# Patient Record
Sex: FEMALE | Race: WHITE | NOT HISPANIC OR LATINO | Employment: FULL TIME | URBAN - METROPOLITAN AREA
[De-identification: names, ages, dates, MRNs, and addresses within clinical notes are randomized per-mention and may not be internally consistent; named-entity substitution may affect disease eponyms.]

---

## 2022-10-17 ENCOUNTER — TRANSFERRED RECORDS (OUTPATIENT)
Dept: HEALTH INFORMATION MANAGEMENT | Facility: CLINIC | Age: 32
End: 2022-10-17

## 2022-11-04 ENCOUNTER — MEDICAL CORRESPONDENCE (OUTPATIENT)
Dept: HEALTH INFORMATION MANAGEMENT | Facility: CLINIC | Age: 32
End: 2022-11-04

## 2022-11-07 ENCOUNTER — TELEPHONE (OUTPATIENT)
Dept: OTOLARYNGOLOGY | Facility: CLINIC | Age: 32
End: 2022-11-07

## 2022-11-07 NOTE — TELEPHONE ENCOUNTER
Writer reached out to patient and received their voicemail. Writer left message requesting they get records sent to us as patient has no records within our healthcare system as well as no information about the referring provider, no any Care Everywhere documents. Writer left fax number for patient to have records sent to as well as direct number for call back. Patient had made note that they wanted to be seen by Dr. Hdz, however, Dr. Hdz will not take new stenosis patients and instead this patient would likely be seen by Dr. Zahida Ruiz or Dr. Kelsey Valadez.

## 2022-11-07 NOTE — TELEPHONE ENCOUNTER
[facility] Call Center    Phone Message    May a detailed message be left on voicemail: yes     Reason for Call: Appointment Intake    Referring Provider Name: Dr Karen Burns, Pulmonologist at [facility]   Diagnosis and/or Symptoms: SUBGLOTTIC STENOSIS    Pt had a bronchoscopy done on Thursday last week and was diagnosed with Subglottic Stenosis. Pt's airway is 85% closed. The pulmonologist wants pt to get in urgently for a surgery. Pt states that the provider that they refererd Pt to within the [facility] system, Pt hasn't been able to get a hold of and would like to move forward with care from [facility].     Referral will be coming from Dr Burns and 's Rheumotologist, Dr Marcella Briseno.     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     Please follow-up with pt to advise.    Action Taken: Other: ent    Travel Screening: Not Applicable

## 2022-11-08 ENCOUNTER — TRANSCRIBE ORDERS (OUTPATIENT)
Dept: OTHER | Age: 32
End: 2022-11-08

## 2022-11-08 DIAGNOSIS — M31.30 GRANULOMATOSIS WITH POLYANGIITIS WITHOUT RENAL INVOLVEMENT (H): Primary | ICD-10-CM

## 2022-11-08 DIAGNOSIS — J38.6 SUBGLOTTIC STENOSIS: ICD-10-CM

## 2022-11-08 NOTE — TELEPHONE ENCOUNTER
FUTURE VISIT INFORMATION      FUTURE VISIT INFORMATION:    Date: 11/21/22    Time: 7:30am    Location: [facility]   REFERRAL INFORMATION:    Referring provider:  Marcella Bojorquez MD    Referring providers clinic:  [facility]    Reason for visit/diagnosis  Per Pt, dx subglottic stenosis referral from Marcella Bojorquez recs being requested by patient    RECORDS REQUESTED FROM:       Clinic name Comments Records Status Imaging Status   [facility] 11/4/22- note from  Marcella Bojroquez MD  11/3/22- Bronchoscopy Flexible   10/17/22- PFT   10/14/22- CT chest   2/16/22- NM PET/ CT Skull   2/14/22- PFT Care everywhere  11/8/22- pending req    -Pacs    [facility]  4/3/22- CT Chest Pulmonary   3/4/22- CT chest Pulmonary  Care everywhere  11/8/22- pending req  -PACs                              Records Requested   November 8, 2022 1:43 PM   [facility]    Outcome Faxed an urgent request for image to be push to [facility].     Imaging Received  November 9, 2022 9:37 AM [facility]   Action: Images from [facility] received and resolved to PACS.     11/9/22 10:06am - sending a 2nd urgent request for them to fax the PFT - [facility]    11/9/22 1:29pm - received PFT from [facility] and sent it to scan- [facility]      Records Requested   November 8, 2022 1:50 PM   [facility]    Outcome Faxed request for image to be push to [facility].     * received images from [facility] and attached it to pt in pacs-[facility]

## 2022-11-21 ENCOUNTER — TELEPHONE (OUTPATIENT)
Dept: OTOLARYNGOLOGY | Facility: CLINIC | Age: 32
End: 2022-11-21

## 2022-11-21 ENCOUNTER — VIRTUAL VISIT (OUTPATIENT)
Dept: OTOLARYNGOLOGY | Facility: CLINIC | Age: 32
End: 2022-11-21
Attending: INTERNAL MEDICINE
Payer: COMMERCIAL

## 2022-11-21 ENCOUNTER — PRE VISIT (OUTPATIENT)
Dept: OTOLARYNGOLOGY | Facility: CLINIC | Age: 32
End: 2022-11-21

## 2022-11-21 VITALS
SYSTOLIC BLOOD PRESSURE: 150 MMHG | HEART RATE: 80 BPM | DIASTOLIC BLOOD PRESSURE: 86 MMHG | OXYGEN SATURATION: 97 % | HEIGHT: 71 IN | WEIGHT: 293 LBS | BODY MASS INDEX: 41.02 KG/M2

## 2022-11-21 DIAGNOSIS — M31.30 GRANULOMATOSIS WITH POLYANGIITIS WITHOUT RENAL INVOLVEMENT (H): ICD-10-CM

## 2022-11-21 DIAGNOSIS — R49.0 DYSPHONIA: ICD-10-CM

## 2022-11-21 DIAGNOSIS — J38.6 SUBGLOTTIC STENOSIS: ICD-10-CM

## 2022-11-21 DIAGNOSIS — E66.01 MORBID OBESITY (H): ICD-10-CM

## 2022-11-21 DIAGNOSIS — J38.6 SUBGLOTTIC STENOSIS: Primary | ICD-10-CM

## 2022-11-21 PROCEDURE — 99207 PR NO CHARGE LOS: CPT

## 2022-11-21 PROCEDURE — 31622 DX BRONCHOSCOPE/WASH: CPT | Performed by: OTOLARYNGOLOGY

## 2022-11-21 PROCEDURE — 99205 OFFICE O/P NEW HI 60 MIN: CPT | Mod: 25 | Performed by: OTOLARYNGOLOGY

## 2022-11-21 RX ORDER — SODIUM CHLORIDE FOR INHALATION 0.9 %
5 VIAL, NEBULIZER (ML) INHALATION PRN
Qty: 600 ML | Refills: 4 | Status: SHIPPED | OUTPATIENT
Start: 2022-11-21

## 2022-11-21 RX ORDER — NAPROXEN 500 MG/1
TABLET ORAL
COMMUNITY
Start: 2022-04-29

## 2022-11-21 RX ORDER — BUDESONIDE 0.5 MG/2ML
0.5 INHALANT ORAL PRN
Qty: 240 ML | Refills: 4 | Status: SHIPPED | OUTPATIENT
Start: 2022-11-21

## 2022-11-21 RX ORDER — PSYLLIUM HUSK 0.4 G
1 CAPSULE ORAL DAILY
COMMUNITY

## 2022-11-21 RX ORDER — ASCORBIC ACID 500 MG
500 TABLET ORAL DAILY
COMMUNITY

## 2022-11-21 RX ORDER — LABETALOL 200 MG/1
200 TABLET, FILM COATED ORAL 2 TIMES DAILY
COMMUNITY
Start: 2021-05-24

## 2022-11-21 RX ORDER — PREDNISONE 5 MG/ML
7.5 SOLUTION ORAL
COMMUNITY
Start: 2022-01-28 | End: 2022-11-22

## 2022-11-21 ASSESSMENT — PAIN SCALES - GENERAL: PAINLEVEL: NO PAIN (0)

## 2022-11-21 NOTE — PROGRESS NOTES
Dear Dr. Bojorquez:    I had the pleasure of meeting Deepthi Aj in consultation at the [facility] the [facility] at your request, for evaluation of breathing problems. The note from our visit follows. Speech recognition software may have been used in the documentation below; input is reviewed before signature to the best of my ability. I appreciate the opportunity to participate in the care of this pleasant patient.    Please feel free to contact me with any questions.    Sincerely yours,    Kelsey Valadez M.D., M.P.H.  , Laryngology  Director, [facility]  Otolaryngology- Head & Neck Surgery  [phone]          =====    HISTORY OF PRESENT ILLNESS:   Deepthi Aj is a pleasant 31 year old female with a history of GPA (c-ANCA, NV-3 positive) who presents with a two month history of throat concerns. Symptoms include mucus in throat, frequent throat-clearing and shortness of breath.    Breathing  She was diagnosed with GPA in Jan 2022 related to sinonasal issues.    She has a history of asthma, but developed a new set of symptoms in early October when it became difficult to climb stairs.  The other symptoms are tied to the breathing issue.  Had Bronchoscopy done earlier this month and was noted to have subglottic stenosis.  Currently can walk a short distance on level ground.    Also has persistent nasal congestion related to GPA    Rituxan has helped with lungs but sinonasal issues have persisted. Currently down to 7.5 mg of prednisone, has been there for 2 months. No med changes prior to onset of current breathing symptoms.    No prior intubations. No family history.    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     Cough/Throat-clearing  Cough began at the same time; worse  in the morning, but sometimes wakes her at night.  Triggers include talking, laughing, eating, mucus.  Sometimes also triggered by the sense that something is stuck in her throat.    Throat discomfort  Has throat discomfort in the morning, feels very dry. Gets better over the course of the day, but worsens with exertion.    Reflux-type symptoms  She experiences heartburn/indigestion: never now; only during pregnancy. She is taking reflux medications.      Prior Epic/ outside records were reviewed for this visit, including:  Karen Burns MD  11/8/22, 11/3/22  Marcella Bojorquez MD - 11/04/2022         MEDICATIONS:     Current Outpatient Medications   Medication Sig Dispense Refill     Avacopan 10 MG CAPS        Calcium Carb-Cholecalciferol (CALCIUM 1000 + D) 1000-20 MG-MCG TABS        cholecalciferol 125 MCG (5000 UT) CAPS two times a day.       labetalol (NORMODYNE) 200 MG tablet Take 200 mg by mouth       naproxen (NAPROSYN) 500 MG tablet TAKE ONE TABLET BY MOUTH TWICE DAILY AS NEEDED       omeprazole (PRILOSEC) 20 MG DR capsule Take 20 mg by mouth       predniSONE (DELTASONE) 5 MG/5ML solution 7.5 mg       riTUXimab (RITUXAN) 10 MG/ML injection        vitamin C (ASCORBIC ACID) 500 MG tablet Take 500 mg by mouth         ALLERGIES:    Allergies   Allergen Reactions     Metformin Diarrhea and Nausea and Vomiting       PAST MEDICAL HISTORY: No past medical history on file.   Per CareEverywhere:  Problem Noted Date   Subglottic stenosis 11/04/2022   Granulomatosis with polyangiitis without renal involvement 02/10/2022   Supervision of other high risk pregnancy, antepartum 09/11/2020   Pre-existing hypertension during pregnancy, antepartum 09/11/2020   Asthma, exercise induced 05/12/2017   Insulin resistance 11/27/2015   Elevated blood pressure reading without diagnosis of hypertension 08/07/2015   Breast mass 08/15/2014   BMI 60.0-69.9, adult 07/14/2010   Varicella 04/03/2008       PAST SURGICAL HISTORY: No past  surgical history on file.   Surgery Date Site/Laterality Comments   WISDOM TEETH EXTRACTION           SECTION          HABITS/SOCIAL HISTORY:    Social History     Tobacco Use     Smoking status: Not on file     Smokeless tobacco: Not on file   Substance Use Topics     Alcohol use: Not on file     Smoking Tobacco: Never           Smokeless Tobacco: Never             Social History  Alcohol Use Standard Drinks/Week   Yes 0 (1 standard drink = 0.6 oz pure alcohol)         FAMILY HISTORY:  No family history on file. Noncontributory.    REVIEW OF SYSTEMS:  Comprehensive 11 point review of systems was reviewed. Positives are as noted below; pertinent findings are as noted in the HPI.     Patient Supplied Answers to Review of Systems   ENT ROS 2022   Cardiopulmonary: Breathing problems, Wheezing         PHYSICAL EXAMINATION:  General: The patient was alert and conversant, and in no acute distress.  BMI 64.  Eyes: PERRL, conjunctiva and lids normal, sclera nonicteric.  Nose: Anterior rhinoscopy: no gross abnormalities. no  bleeding; no significant mucopurulence; septum grossly normal, moderate  mucoid drainage and/or crusting. Saddle nose deformity.  Oral cavity/oropharynx: No masses or lesions. Dentition in good condition. Floor of mouth and oral tongue soft to palpation. Tongue mobility and palate elevation intact and symmetric.  Ears: Normal auricles, external auditory canals bilaterally. Visualized portions of tympanic membranes normal bilaterally.   Neck: No palpable cervical lymphadenopathy. There was mild tenderness to palpation of the thyrohyoid space, which was narrow. No obvious thyroid abnormality. Landmarks indistinct due to habitus. Good neck extension.  Resp: Breathing comfortably, no stridor or stertor at rest, but with obvious noisy breathing on rapid inhalation.  Neuro: Symmetric facial function. Other cranial nerves as documented above.  Psych: Normal affect, pleasant and  cooperative.  Voice/speech: Mild dysphonia characterized by breathiness and roughness.  Extremities: No cyanosis, clubbing, or edema of the upper extremities.      PROCEDURE:   Flexible Bronchoscopy (00338)  Indications: This procedure was warranted to evaluate the patient's airway anatomy. Risks, benefits, and alternatives were discussed.  Description: After informed consent was obtained, a time-out was performed to confirm patient identity, procedure, and procedure site. Topical 3% lidocaine with 0.25% phenylephrine was applied to the nasal cavities and oropharynx. 3 ml of 2% lidocaine was delivered via catheter through the channel of the endoscope and the patient was instructed to cough and expectorate into the suction. I performed the endoscopy and no complications were apparent.  Performed by: Kelsey Valadez MD MPH  Findings: Glottis patent. Subglottis with moderate to severe stenosis. Trachea clear. Kim sharp. Unremarkable takeoffs of mainstem bronchi.                              LABS:                Chest CT 10/14/22    IMAGING/RESULTS reviewed, with pertinent report excerpts below:  LUNG AND LARGE AIRWAYS: Previously seen cavitary lesions in both upper lobes and right lower lobe are collapsed with residual nodular opacities largest measuring 8 mm. Focal consolidation in the anterior medial right upper lobe measuring 3.3 x 1.1 cm is significantly decreased since the prior examination previously measuring up to 4.8 x 5 cm. 6 mm nodule in the medial aspect of the right middle lobe series 3 image 40 is significantly decreased from the prior (previous 1.4 cm. Previously seen subpleural nodular opacity in the medial left upper lobe is nearly completely resolved with some minimal residual scarring (image 11). No new or enlarging pulmonary nodules or consolidative opacities. No persistent cavitary lesions.     IMPRESSION:   1. Collapse of previously seen cavitary lesions in both lungs. Residual nodular  opacities measuring up to 8 mm.   2. Interval significant decrease in size of consolidative pulmonary opacities in both lungs.    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 She is on prednisone 7.5 mg daily at present, and the plan is to increase frequency of rituximab moving forward. She denies a substantial intubation history. Personal history of autoimmune problems is positive for GPA as above; family history of autoimmune problems is negative.     We discussed potential means of managing airway stenosis, including open resection, and endoscopic incision and dilation. She was more interested in the endoscopic approach (microdirect laryngoscopy with incision, steroid injection, possible balloon dilation of subglottic stenosis, possible CO2 laser. Risks of surgery, including injury to lips/ teeth/ tongue/ jaw/ throat/ airway/lungs, risks of general anesthesia, and temporary/permanent hoarseness were discussed. This included temporary or permanent dysgeusia, hypesthesia, or motion abnormalities of the tongue. We also discussed a potential need for additional procedures in the future, particularly if the stenosis recurs, which is relatively common. We discussed that tracheotomy is rarely necessary. She was comfortable with steroid injection into the stenosis. If she requires frequent surgical intervention we will consider mitomycin in the future. We discussed that the procedure will be outpatient with a low threshold for overnight observation if there are any concerns, since it is an airway procedure. She understands that she will need a pre-operative history and physical as well as COVID testing. After hearing the risks and benefits, she indicated that she would like to proceed with endoscopic surgery. We will get this scheduled over the next few weeks.    We also discussed potential adjuvant medical management post-op including PPI, steroid inhaler, low dose Bactrim,  and potential advantages/disadvantages of each. She is already on PPI and Bactrim, and is comfortable with starting routine steroid nebs. We will also rx saline as needed for nebulization, to thin secretions and make them easier to cough out.    Finally we also discussed consideration of serial awake steroid injections post-operatively. She would like to hold off on scheduling those for now.    She was provided a peak flow meter and instructions as well, for disease monitoring.    I appreciate the opportunity to participate in the care of this patient.  Today's visit required additional screening time, PPE, and cleaning measures to allow for a safe in-person visit, due to the public health emergency.  I spent a total of 65 minutes on 11/21/2022 in chart review, review of tests, patient visit, documentation, care coordination, and/or discussion with other providers about the issues documented above, separate from any documented procedure(s).

## 2022-11-21 NOTE — TELEPHONE ENCOUNTER
Called patient to schedule surgery with Dr. Valadez    Date of Surgery: 11/23    Location of surgery: [facility]    Pre-Op H&P: PCP    Pre/Post Imaging:  Not Applicable    Discussed COVID-19 Testing: Yes home test    Post-Op Appt Date: 3 weeks    Surgery Packet Mailed: sohan      Additional comments: patient was able to get in with her PCP tomorrow 11/22. Will fax paperwork        Dariana Cook on 11/21/2022 at 10:39 AM

## 2022-11-21 NOTE — LETTER
11/21/2022      RE: Deepthi Aj  [address] [facility] [address]       Dear Dr. Bojorquez:    I had the pleasure of meeting Deepthi Aj in consultation at the [facility] the [facility] at your request, for evaluation of breathing problems. The note from our visit follows. Speech recognition software may have been used in the documentation below; input is reviewed before signature to the best of my ability. I appreciate the opportunity to participate in the care of this pleasant patient.    Please feel free to contact me with any questions.    Sincerely yours,    Kelsey Valadez M.D., M.P.H.  , Laryngology  Director, [facility]  Otolaryngology- Head & Neck Surgery  [phone]          =====    HISTORY OF PRESENT ILLNESS:   Deepthi Aj is a pleasant 31 year old female with a history of GPA (c-ANCA, TN-3 positive) who presents with a two month history of throat concerns. Symptoms include mucus in throat, frequent throat-clearing and shortness of breath.    Breathing  She was diagnosed with GPA in Jan 2022 related to sinonasal issues.    She has a history of asthma, but developed a new set of symptoms in early October when it became difficult to climb stairs.  The other symptoms are tied to the breathing issue.  Had Bronchoscopy done earlier this month and was noted to have subglottic stenosis.  Currently can walk a short distance on level ground.    Also has persistent nasal congestion related to GPA    Rituxan has helped with lungs but sinonasal issues have persisted. Currently down to 7.5 mg of prednisone, has been there for 2 months. No med changes prior to onset of current breathing symptoms.    No prior intubations. No family history.    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     Cough/Throat-clearing  Cough began at the same time; worse in the morning, but sometimes wakes her at night.  Triggers include talking, laughing, eating, mucus.  Sometimes also triggered by the sense that something is stuck in her throat.    Throat discomfort  Has throat discomfort in the morning, feels very dry. Gets better over the course of the day, but worsens with exertion.    Reflux-type symptoms  She experiences heartburn/indigestion: never now; only during pregnancy. She is taking reflux medications.      Prior Epic/ outside records were reviewed for this visit, including:  Karen Burns MD  11/8/22, 11/3/22  Marcella Bojorquez MD - 11/04/2022         MEDICATIONS:     Current Outpatient Medications   Medication Sig Dispense Refill     Avacopan 10 MG CAPS        Calcium Carb-Cholecalciferol (CALCIUM 1000 + D) 1000-20 MG-MCG TABS        cholecalciferol 125 MCG (5000 UT) CAPS two times a day.       labetalol (NORMODYNE) 200 MG tablet Take 200 mg by mouth       naproxen (NAPROSYN) 500 MG tablet TAKE ONE TABLET BY MOUTH TWICE DAILY AS NEEDED       omeprazole (PRILOSEC) 20 MG DR capsule Take 20 mg by mouth       predniSONE (DELTASONE) 5 MG/5ML solution 7.5 mg       riTUXimab (RITUXAN) 10 MG/ML injection        vitamin C (ASCORBIC ACID) 500 MG tablet Take 500 mg by mouth         ALLERGIES:    Allergies   Allergen Reactions     Metformin Diarrhea and Nausea and Vomiting       PAST MEDICAL HISTORY: No past medical history on file.   Per CareEverywhere:  Problem Noted Date   Subglottic stenosis 11/04/2022   Granulomatosis with polyangiitis without renal involvement 02/10/2022   Supervision of other high risk pregnancy, antepartum 09/11/2020   Pre-existing hypertension during pregnancy, antepartum 09/11/2020   Asthma, exercise induced 05/12/2017   Insulin resistance 11/27/2015   Elevated blood pressure reading without diagnosis of hypertension 08/07/2015   Breast mass  08/15/2014   BMI 60.0-69.9, adult 2010   Varicella 2008       PAST SURGICAL HISTORY: No past surgical history on file.   Surgery Date Site/Laterality Comments   WISDOM TEETH EXTRACTION           SECTION          HABITS/SOCIAL HISTORY:    Social History     Tobacco Use     Smoking status: Not on file     Smokeless tobacco: Not on file   Substance Use Topics     Alcohol use: Not on file     Smoking Tobacco: Never           Smokeless Tobacco: Never             Social History  Alcohol Use Standard Drinks/Week   Yes 0 (1 standard drink = 0.6 oz pure alcohol)         FAMILY HISTORY:  No family history on file. Noncontributory.    REVIEW OF SYSTEMS:  Comprehensive 11 point review of systems was reviewed. Positives are as noted below; pertinent findings are as noted in the HPI.     Patient Supplied Answers to Review of Systems   ENT ROS 2022   Cardiopulmonary: Breathing problems, Wheezing         PHYSICAL EXAMINATION:  General: The patient was alert and conversant, and in no acute distress.  BMI 64.  Eyes: PERRL, conjunctiva and lids normal, sclera nonicteric.  Nose: Anterior rhinoscopy: no gross abnormalities. no  bleeding; no significant mucopurulence; septum grossly normal, moderate  mucoid drainage and/or crusting. Saddle nose deformity.  Oral cavity/oropharynx: No masses or lesions. Dentition in good condition. Floor of mouth and oral tongue soft to palpation. Tongue mobility and palate elevation intact and symmetric.  Ears: Normal auricles, external auditory canals bilaterally. Visualized portions of tympanic membranes normal bilaterally.   Neck: No palpable cervical lymphadenopathy. There was mild tenderness to palpation of the thyrohyoid space, which was narrow. No obvious thyroid abnormality. Landmarks indistinct due to habitus. Good neck extension.  Resp: Breathing comfortably, no stridor or stertor at rest, but with obvious noisy breathing on rapid inhalation.  Neuro: Symmetric facial  function. Other cranial nerves as documented above.  Psych: Normal affect, pleasant and cooperative.  Voice/speech: Mild dysphonia characterized by breathiness and roughness.  Extremities: No cyanosis, clubbing, or edema of the upper extremities.      PROCEDURE:   Flexible Bronchoscopy (25974)  Indications: This procedure was warranted to evaluate the patient's airway anatomy. Risks, benefits, and alternatives were discussed.  Description: After informed consent was obtained, a time-out was performed to confirm patient identity, procedure, and procedure site. Topical 3% lidocaine with 0.25% phenylephrine was applied to the nasal cavities and oropharynx. 3 ml of 2% lidocaine was delivered via catheter through the channel of the endoscope and the patient was instructed to cough and expectorate into the suction. I performed the endoscopy and no complications were apparent.  Performed by: Kelsey Valadez MD MPH  Findings: Glottis patent. Subglottis with moderate to severe stenosis. Trachea clear. Kim sharp. Unremarkable takeoffs of mainstem bronchi.                              LABS:                Chest CT 10/14/22    IMAGING/RESULTS reviewed, with pertinent report excerpts below:  LUNG AND LARGE AIRWAYS: Previously seen cavitary lesions in both upper lobes and right lower lobe are collapsed with residual nodular opacities largest measuring 8 mm. Focal consolidation in the anterior medial right upper lobe measuring 3.3 x 1.1 cm is significantly decreased since the prior examination previously measuring up to 4.8 x 5 cm. 6 mm nodule in the medial aspect of the right middle lobe series 3 image 40 is significantly decreased from the prior (previous 1.4 cm. Previously seen subpleural nodular opacity in the medial left upper lobe is nearly completely resolved with some minimal residual scarring (image 11). No new or enlarging pulmonary nodules or consolidative opacities. No persistent cavitary lesions.     IMPRESSION:    1. Collapse of previously seen cavitary lesions in both lungs. Residual nodular opacities measuring up to 8 mm.   2. Interval significant decrease in size of consolidative pulmonary opacities in both lungs.    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 She is on prednisone 7.5 mg daily at present, and the plan is to increase frequency of rituximab moving forward. She denies a substantial intubation history. Personal history of autoimmune problems is positive for GPA as above; family history of autoimmune problems is negative.     We discussed potential means of managing airway stenosis, including open resection, and endoscopic incision and dilation. She was more interested in the endoscopic approach (microdirect laryngoscopy with incision, steroid injection, possible balloon dilation of subglottic stenosis, possible CO2 laser. Risks of surgery, including injury to lips/ teeth/ tongue/ jaw/ throat/ airway/lungs, risks of general anesthesia, and temporary/permanent hoarseness were discussed. This included temporary or permanent dysgeusia, hypesthesia, or motion abnormalities of the tongue. We also discussed a potential need for additional procedures in the future, particularly if the stenosis recurs, which is relatively common. We discussed that tracheotomy is rarely necessary. She was comfortable with steroid injection into the stenosis. If she requires frequent surgical intervention we will consider mitomycin in the future. We discussed that the procedure will be outpatient with a low threshold for overnight observation if there are any concerns, since it is an airway procedure. She understands that she will need a pre-operative history and physical as well as COVID testing. After hearing the risks and benefits, she indicated that she would like to proceed with endoscopic surgery. We will get this scheduled over the next few weeks.    We also discussed potential  adjuvant medical management post-op including PPI, steroid inhaler, low dose Bactrim, and potential advantages/disadvantages of each. She is already on PPI and Bactrim, and is comfortable with starting routine steroid nebs. We will also rx saline as needed for nebulization, to thin secretions and make them easier to cough out.    Finally we also discussed consideration of serial awake steroid injections post-operatively. She would like to hold off on scheduling those for now.    She was provided a peak flow meter and instructions as well, for disease monitoring.    I appreciate the opportunity to participate in the care of this patient.  Today's visit required additional screening time, PPE, and cleaning measures to allow for a safe in-person visit, due to the public health emergency.  I spent a total of 65 minutes on 11/21/2022 in chart review, review of tests, patient visit, documentation, care coordination, and/or discussion with other providers about the issues documented above, separate from any documented procedure(s).        Kelsey Valadez MD

## 2022-11-21 NOTE — PATIENT INSTRUCTIONS
1.  You were seen in the ENT Clinic today by . If you have any questions or concerns after your appointment, please call [phone]. Press option #1 for scheduling related needs. Press option #3 for Nurse advice.    2.   has recommended the following:   - surgery.   - peak flow meter. Start using after surgery and report your numbers via my chart, as instructed   - use saline neb solution and pulmicort neb solution as needed. Prescription sent to your pharmacy      3.  Plan is to return to clinic for post operative follow up. TBD.      Lala Tang LPN  [phone]  [facility] - Otolaryngology

## 2022-11-21 NOTE — TELEPHONE ENCOUNTER
Patient called back and said she would take 11/23 but her doctors office said previously that they couldn't get her in for her pre op until next Monday. I advised her to callback and tell them her surgery is on Wednesday and to call us back once she talks with them    Dariana Cook, Surgery Scheduler 11/21/2022 at 10:27 AM

## 2022-11-21 NOTE — TELEPHONE ENCOUNTER
Left patient a voicemail to schedule Microdirect laryngoscopy with incision, steroid injection, possible dilation, possible CO2 laser, for subglottic stenosis (N/A)  with Dr. Rory Cook on 11/21/2022 at 10:21 AM

## 2022-11-21 NOTE — Clinical Note
11/21/2022       RE: Deepthi Aj  [address] [place] [facility] [address]     Dear Colleague,    Thank you for referring your patient, Deepthi Aj, to the [facility] EAR NOSE AND THROAT CLINIC [place] at [facility]. Please see a copy of my visit note below.    Dear Dr. Bojorquez:    I had the pleasure of meeting Deepthi Aj in consultation at the [facility] of the [facility] Otolaryngology Clinic at your request, for evaluation of breathing problems. The note from our visit follows. Speech recognition software may have been used in the documentation below; input is reviewed before signature to the best of my ability. I appreciate the opportunity to participate in the care of this pleasant patient.    Please feel free to contact me with any questions.    Sincerely yours,    Kelsey Valadez M.D., M.P.H.  , Laryngology  Director, [facility]  Otolaryngology- Head & Neck Surgery  [phone]          =====    HISTORY OF PRESENT ILLNESS:   Deepthi Aj is a pleasant 31 year old female with a history of GPA (c-ANCA, VT-3 positive) who presents with a two month history of throat concerns. Symptoms include {[facility] ENT VOICESX PHI:[id number]}.      Breathing  Diagnosed with GPA in Jan 2022 related to sinonasal issues.    Has history of asthma, but developed a new set of symptoms in early October when it became difficult to climb stairs.  The other symptoms are tied to the breathing issue.  Had Bronchoscopy done earlier this month and was noted to have subglottic stenosis.  Currently can walk a short distance on level ground.    Also has nasal congestion related to GPA    Rituxan has helped with lungs but sinonasal issues have persisted.  Currently down to 7.5 mg of prednisone, has been there for 2 months. No med changes prior to onset of symptoms.    No prior intubations. No family  history.      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     Cough/Throat-clearing  Cough began at the same time; worse in the morning, but sometimes wakes her at night.  Triggers include talking, laughing, eating, mucus.  Sometimes also triggered by the sense that something is stuck in her throat.    Throat discomfort  Has throat discomfort in the morning, feels very dry. Gets better over the course of the day, but worsens with exertion.    Reflux-type symptoms  She experiences heartburn/indigestion: never now; only during pregnancy. She is taking reflux medications.      Prior Epic/ outside records were reviewed for this visit, including:  Karen Burns MD  11/8/22, 11/3/22  Marcella Bojorquez MD - 11/04/2022           MEDICATIONS:     Current Outpatient Medications   Medication Sig Dispense Refill     Avacopan 10 MG CAPS        Calcium Carb-Cholecalciferol (CALCIUM 1000 + D) 1000-20 MG-MCG TABS        cholecalciferol 125 MCG (5000 UT) CAPS two times a day.       labetalol (NORMODYNE) 200 MG tablet Take 200 mg by mouth       naproxen (NAPROSYN) 500 MG tablet TAKE ONE TABLET BY MOUTH TWICE DAILY AS NEEDED       omeprazole (PRILOSEC) 20 MG DR capsule Take 20 mg by mouth       predniSONE (DELTASONE) 5 MG/5ML solution 7.5 mg       riTUXimab (RITUXAN) 10 MG/ML injection        vitamin C (ASCORBIC ACID) 500 MG tablet Take 500 mg by mouth         ALLERGIES:    Allergies   Allergen Reactions     Metformin Diarrhea and Nausea and Vomiting       PAST MEDICAL HISTORY: No past medical history on file.   Per CareEverywhere:  Problem Noted Date   Subglottic stenosis 11/04/2022   Granulomatosis with polyangiitis without renal involvement 02/10/2022   Supervision of other high risk pregnancy, antepartum 09/11/2020   Pre-existing hypertension during pregnancy, antepartum 09/11/2020    Asthma, exercise induced 2017   Insulin resistance 2015   Elevated blood pressure reading without diagnosis of hypertension 2015   Breast mass 08/15/2014   BMI 60.0-69.9, adult 2010   Varicella 2008         PAST SURGICAL HISTORY: No past surgical history on file.   Surgery Date Site/Laterality Comments   WISDOM TEETH EXTRACTION           SECTION          HABITS/SOCIAL HISTORY:    Social History     Tobacco Use     Smoking status: Not on file     Smokeless tobacco: Not on file   Substance Use Topics     Alcohol use: Not on file     Smoking Tobacco: Never           Smokeless Tobacco: Never             Social History  Alcohol Use Standard Drinks/Week   Yes 0 (1 standard drink = 0.6 oz pure alcohol)         FAMILY HISTORY:  No family history on file. Noncontributory.    REVIEW OF SYSTEMS:  Comprehensive 11 point review of systems was reviewed. Positives are as noted below; pertinent findings are as noted in the HPI.     Patient Supplied Answers to Review of Systems   ENT ROS 2022   Cardiopulmonary: Breathing problems, Wheezing         PHYSICAL EXAMINATION:  General: The patient was alert and conversant, and in no acute distress.  BMI 64.  Eyes: PERRL, conjunctiva and lids normal, sclera nonicteric.  Nose: Anterior rhinoscopy: no gross abnormalities. no  bleeding; no significant mucopurulence; septum grossly normal, moderate  mucoid drainage and/or crusting. Saddle nose deformity.  Oral cavity/oropharynx: No masses or lesions. Dentition in good condition. Floor of mouth and oral tongue soft to palpation. Tongue mobility and palate elevation intact and symmetric.  Ears: Normal auricles, external auditory canals bilaterally. Visualized portions of tympanic membranes normal bilaterally.   Neck: No palpable cervical lymphadenopathy. There was mild tenderness to palpation of the thyrohyoid space, which was narrow. No obvious thyroid abnormality. Landmarks indistinct due to  habitus. Good neck extension.  Resp: Breathing comfortably, no stridor or stertor at rest, but with obvious noisy breathing on rapid inhalation.  Neuro: Symmetric facial function. Other cranial nerves as documented above.  Psych: Normal affect, pleasant and cooperative.  Voice/speech: Mild dysphonia characterized by breathiness and roughness.  Extremities: No cyanosis, clubbing, or edema of the upper extremities.        PROCEDURE:   Flexible Bronchoscopy (84393)  Indications: This procedure was warranted to evaluate the patient's airway anatomy. Risks, benefits, and alternatives were discussed.  Description: After informed consent was obtained, a time-out was performed to confirm patient identity, procedure, and procedure site. Topical 3% lidocaine with 0.25% phenylephrine was applied to the nasal cavities and oropharynx. 3 ml of 2% lidocaine was delivered via catheter through the channel of the endoscope and the patient was instructed to cough and expectorate into the suction. I performed the endoscopy and no complications were apparent.  Performed by: Kelsey Valadez MD MPH  Findings: Glottis patent. Subglottis with moderate to severe stenosis. Trachea clear. Kim sharp. Unremarkable takeoffs of mainstem bronchi.                              LABS:                Chest CT 10/14/22    IMAGING/RESULTS reviewed, with pertinent report excerpts below:  LUNG AND LARGE AIRWAYS: Previously seen cavitary lesions in both upper lobes and right lower lobe are collapsed with residual nodular opacities largest measuring 8 mm. Focal consolidation in the anterior medial right upper lobe measuring 3.3 x 1.1 cm is significantly decreased since the prior examination previously measuring up to 4.8 x 5 cm. 6 mm nodule in the medial aspect of the right middle lobe series 3 image 40 is significantly decreased from the prior (previous 1.4 cm. Previously seen subpleural nodular opacity in the medial left upper lobe is nearly completely  resolved with some minimal residual scarring (image 11). No new or enlarging pulmonary nodules or consolidative opacities. No persistent cavitary lesions.     IMPRESSION:   1. Collapse of previously seen cavitary lesions in both lungs. Residual nodular opacities measuring up to 8 mm.   2. Interval significant decrease in size of consolidative pulmonary opacities in both lungs.    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 She is on prednisone 7.5 mg daily at present, and the plan is to increase frequency of rituximab moving forward. She denies a substantial intubation history. Personal history of autoimmune problems is positive for GPA as above; family history of autoimmune problems is negative.     We discussed potential means of managing airway stenosis, including open resection, and endoscopic incision and dilation. She was more interested in the endoscopic approach (microdirect laryngoscopy with incision, steroid injection, possible balloon dilation of subglottic stenosis, possible CO2 laser. Risks of surgery, including injury to lips/ teeth/ tongue/ jaw/ throat/ airway/lungs, risks of general anesthesia, and temporary/permanent hoarseness were discussed. This included temporary or permanent dysgeusia, hypesthesia, or motion abnormalities of the tongue. We also discussed a potential need for additional procedures in the future, particularly if the stenosis recurs, which is relatively common. We discussed that tracheotomy is rarely necessary. She was comfortable with steroid injection into the stenosis. If she requires frequent surgical intervention we will consider mitomycin in the future. We discussed that the procedure will be outpatient with a low threshold for overnight observation if there are any concerns, since it is an airway procedure. She understands that she will need a pre-operative history and physical as well as COVID testing. After hearing the risks  and benefits, she indicated that she would like to proceed with endoscopic surgery. We will get this scheduled over the next few weeks.    We also discussed potential adjuvant medical management post-op including PPI, steroid inhaler, low dose Bactrim, and potential advantages/disadvantages of each. She is already on PPI and Bactrim, and is comfortable with starting routine steroid nebs. We will also rx saline as needed for nebulization, to thin secretions and make them easier to cough out.    Finally we also discussed consideration of serial awake steroid injections post-operatively. She would like to hold off on scheduling those for now.    She was provided a peak flow meter and instructions as well, for disease monitoring.    I appreciate the opportunity to participate in the care of this patient.  Today's visit required additional screening time, PPE, and cleaning measures to allow for a safe in-person visit, due to the public health emergency.  I spent a total of 65 minutes on 11/21/2022 in chart review, review of tests, patient visit, documentation, care coordination, and/or discussion with other providers about the issues documented above, separate from any documented procedure(s).      Dear Dr. Bojorquez:    I had the pleasure of meeting Deepthiremedios Aj in consultation at the [facility] of the [facility] Otolaryngology Clinic at your request, for evaluation of breathing problems. The note from our visit follows. Speech recognition software may have been used in the documentation below; input is reviewed before signature to the best of my ability. I appreciate the opportunity to participate in the care of this pleasant patient.    Please feel free to contact me with any questions.    Sincerely yours,    Kelsey Valadez M.D., M.P.H.  , Laryngology  Director, [facility]  Otolaryngology- Head & Neck  Surgery  [phone]          =====    HISTORY OF PRESENT ILLNESS:   Deepthi Aj is a pleasant 31 year old female with a history of GPA (c-ANCA, [place]-3 positive) who presents with a two month history of throat concerns. Symptoms include mucus in throat, frequent throat-clearing and shortness of breath.    Breathing  She was diagnosed with GPA in Jan 2022 related to sinonasal issues.    She has a history of asthma, but developed a new set of symptoms in early October when it became difficult to climb stairs.  The other symptoms are tied to the breathing issue.  Had Bronchoscopy done earlier this month and was noted to have subglottic stenosis.  Currently can walk a short distance on level ground.    Also has persistent nasal congestion related to GPA    Rituxan has helped with lungs but sinonasal issues have persisted. Currently down to 7.5 mg of prednisone, has been there for 2 months. No med changes prior to onset of current breathing symptoms.    No prior intubations. No family history.    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     Cough/Throat-clearing  Cough began at the same time; worse in the morning, but sometimes wakes her at night.  Triggers include talking, laughing, eating, mucus.  Sometimes also triggered by the sense that something is stuck in her throat.    Throat discomfort  Has throat discomfort in the morning, feels very dry. Gets better over the course of the day, but worsens with exertion.    Reflux-type symptoms  She experiences heartburn/indigestion: never now; only during pregnancy. She is taking reflux medications.      Prior Epic/ outside records were reviewed for this visit, including:  Karen Burns MD  11/8/22, 11/3/22  Marcella Bojorquez MD - 11/04/2022         MEDICATIONS:     Current Outpatient Medications   Medication Sig Dispense Refill      Avacopan 10 MG CAPS        Calcium Carb-Cholecalciferol (CALCIUM 1000 + D) 1000-20 MG-MCG TABS        cholecalciferol 125 MCG (5000 UT) CAPS two times a day.       labetalol (NORMODYNE) 200 MG tablet Take 200 mg by mouth       naproxen (NAPROSYN) 500 MG tablet TAKE ONE TABLET BY MOUTH TWICE DAILY AS NEEDED       omeprazole (PRILOSEC) 20 MG DR capsule Take 20 mg by mouth       predniSONE (DELTASONE) 5 MG/5ML solution 7.5 mg       riTUXimab (RITUXAN) 10 MG/ML injection        vitamin C (ASCORBIC ACID) 500 MG tablet Take 500 mg by mouth         ALLERGIES:    Allergies   Allergen Reactions     Metformin Diarrhea and Nausea and Vomiting       PAST MEDICAL HISTORY: No past medical history on file.   Per CareEverywhere:  Problem Noted Date   Subglottic stenosis 2022   Granulomatosis with polyangiitis without renal involvement 02/10/2022   Supervision of other high risk pregnancy, antepartum 2020   Pre-existing hypertension during pregnancy, antepartum 2020   Asthma, exercise induced 2017   Insulin resistance 2015   Elevated blood pressure reading without diagnosis of hypertension 2015   Breast mass 08/15/2014   BMI 60.0-69.9, adult 2010   Varicella 2008       PAST SURGICAL HISTORY: No past surgical history on file.   Surgery Date Site/Laterality Comments   WISDOM TEETH EXTRACTION           SECTION          HABITS/SOCIAL HISTORY:    Social History     Tobacco Use     Smoking status: Not on file     Smokeless tobacco: Not on file   Substance Use Topics     Alcohol use: Not on file     Smoking Tobacco: Never           Smokeless Tobacco: Never             Social History  Alcohol Use Standard Drinks/Week   Yes 0 (1 standard drink = 0.6 oz pure alcohol)         FAMILY HISTORY:  No family history on file. Noncontributory.    REVIEW OF SYSTEMS:  Comprehensive 11 point review of systems was reviewed. Positives are as noted below; pertinent findings are as noted in the HPI.      Patient Supplied Answers to Review of Systems   ENT ROS 11/18/2022   Cardiopulmonary: Breathing problems, Wheezing         PHYSICAL EXAMINATION:  General: The patient was alert and conversant, and in no acute distress.  BMI 64.  Eyes: PERRL, conjunctiva and lids normal, sclera nonicteric.  Nose: Anterior rhinoscopy: no gross abnormalities. no  bleeding; no significant mucopurulence; septum grossly normal, moderate  mucoid drainage and/or crusting. Saddle nose deformity.  Oral cavity/oropharynx: No masses or lesions. Dentition in good condition. Floor of mouth and oral tongue soft to palpation. Tongue mobility and palate elevation intact and symmetric.  Ears: Normal auricles, external auditory canals bilaterally. Visualized portions of tympanic membranes normal bilaterally.   Neck: No palpable cervical lymphadenopathy. There was mild tenderness to palpation of the thyrohyoid space, which was narrow. No obvious thyroid abnormality. Landmarks indistinct due to habitus. Good neck extension.  Resp: Breathing comfortably, no stridor or stertor at rest, but with obvious noisy breathing on rapid inhalation.  Neuro: Symmetric facial function. Other cranial nerves as documented above.  Psych: Normal affect, pleasant and cooperative.  Voice/speech: Mild dysphonia characterized by breathiness and roughness.  Extremities: No cyanosis, clubbing, or edema of the upper extremities.      PROCEDURE:   Flexible Bronchoscopy (15832)  Indications: This procedure was warranted to evaluate the patient's airway anatomy. Risks, benefits, and alternatives were discussed.  Description: After informed consent was obtained, a time-out was performed to confirm patient identity, procedure, and procedure site. Topical 3% lidocaine with 0.25% phenylephrine was applied to the nasal cavities and oropharynx. 3 ml of 2% lidocaine was delivered via catheter through the channel of the endoscope and the patient was instructed to cough and  expectorate into the suction. I performed the endoscopy and no complications were apparent.  Performed by: Kelsey Valadez MD MPH  Findings: Glottis patent. Subglottis with moderate to severe stenosis. Trachea clear. Kim sharp. Unremarkable takeoffs of mainstem bronchi.                              LABS:                Chest CT 10/14/22    IMAGING/RESULTS reviewed, with pertinent report excerpts below:  LUNG AND LARGE AIRWAYS: Previously seen cavitary lesions in both upper lobes and right lower lobe are collapsed with residual nodular opacities largest measuring 8 mm. Focal consolidation in the anterior medial right upper lobe measuring 3.3 x 1.1 cm is significantly decreased since the prior examination previously measuring up to 4.8 x 5 cm. 6 mm nodule in the medial aspect of the right middle lobe series 3 image 40 is significantly decreased from the prior (previous 1.4 cm. Previously seen subpleural nodular opacity in the medial left upper lobe is nearly completely resolved with some minimal residual scarring (image 11). No new or enlarging pulmonary nodules or consolidative opacities. No persistent cavitary lesions.     IMPRESSION:   1. Collapse of previously seen cavitary lesions in both lungs. Residual nodular opacities measuring up to 8 mm.   2. Interval significant decrease in size of consolidative pulmonary opacities in both lungs.    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 She is on prednisone 7.5 mg daily at present, and the plan is to increase frequency of rituximab moving forward. She denies a substantial intubation history. Personal history of autoimmune problems is positive for GPA as above; family history of autoimmune problems is negative.     We discussed potential means of managing airway stenosis, including open resection, and endoscopic incision and dilation. She was more interested in the endoscopic approach (microdirect laryngoscopy with  incision, steroid injection, possible balloon dilation of subglottic stenosis, possible CO2 laser. Risks of surgery, including injury to lips/ teeth/ tongue/ jaw/ throat/ airway/lungs, risks of general anesthesia, and temporary/permanent hoarseness were discussed. This included temporary or permanent dysgeusia, hypesthesia, or motion abnormalities of the tongue. We also discussed a potential need for additional procedures in the future, particularly if the stenosis recurs, which is relatively common. We discussed that tracheotomy is rarely necessary. She was comfortable with steroid injection into the stenosis. If she requires frequent surgical intervention we will consider mitomycin in the future. We discussed that the procedure will be outpatient with a low threshold for overnight observation if there are any concerns, since it is an airway procedure. She understands that she will need a pre-operative history and physical as well as COVID testing. After hearing the risks and benefits, she indicated that she would like to proceed with endoscopic surgery. We will get this scheduled over the next few weeks.    We also discussed potential adjuvant medical management post-op including PPI, steroid inhaler, low dose Bactrim, and potential advantages/disadvantages of each. She is already on PPI and Bactrim, and is comfortable with starting routine steroid nebs. We will also rx saline as needed for nebulization, to thin secretions and make them easier to cough out.    Finally we also discussed consideration of serial awake steroid injections post-operatively. She would like to hold off on scheduling those for now.    She was provided a peak flow meter and instructions as well, for disease monitoring.    I appreciate the opportunity to participate in the care of this patient.  Today's visit required additional screening time, PPE, and cleaning measures to allow for a safe in-person visit, due to the public health  emergency.  I spent a total of 65 minutes on 11/21/2022 in chart review, review of tests, patient visit, documentation, care coordination, and/or discussion with other providers about the issues documented above, separate from any documented procedure(s).        Again, thank you for allowing me to participate in the care of your patient.      Sincerely,    Kelsey Valadez MD

## 2022-11-21 NOTE — PROGRESS NOTES
"[facility]  Evaluation report    Clinician: Silas Andre M.M., M.A., CCC/SLP  Seen in conjunction with: Dr. Valadez  Referring physician:  Dr. Bojorquez  Patient: Deepthi Aj  Date of Visit: 11/21/2022    HISTORY  Chief complaint: Deepthi Aj is a 31 year old woman presenting today for evaluation of breathing difficutlies.    Chart Review: Patient has a history of granulomatosis with polyangiitis diagnosed via biopsy of pulmonary nodules with positive ANCA in January of 2022.  This has been managed with prednisone, Bactrim, as well as rituximab infusions in August and September of this year.  Unfortunately despite this management over the past 6 weeks she has been experiencing increased shortness of breath walking around her home, and based on independent research recognized this could represent subglottic stenosis.  She was seen by pulmonology, and bronchoscopy demonstrated an area of stenosis near the level of the glottis.  Patient was referred to our clinic for further evaluation and treatment as needed.  She presents for this today.    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    COUGH/THROAT CLEARING    Onset and inciting incident: Began 45 days ago (early October) gradually    Progression: Worsening over time    States that this problem bothers her \"very much\"    Worsens with: Worse in the morning    Cough is constant but unpredictable    Usually nonproductive    Wakes at night to clear throat    her cough/ throat clearing triggers include:  o Talking  o Laughing  o Eating (feel like things getting \"stuck\")  o Mucus  o Lying down  o Being at work    BREATHING    Onset and inciting incident: Approximately 45 days ago (early October) gradually    Progression: Worsening over time    Improves with: " "nothing (no benefit from inhalers / nebs)    Worsens with: exertion    Frequent throat clearing    Noisy breathing (all the time, but worsens when exerting)    States that she can walk a short distance on a flat surface, but anything beyond this will elicit shortness of breath    ADDITIONAL    Throat discomfort    Onset and inciting incident: 45 days ago    Wakes up feeling like her throat is a \"desert\"    Worsens with exertion    If she is exerting herself more will get similar sensation    Resolves as the day goesn    Patient denies significant dysphagia and dysphonia.     OTHER PERTINENT HISTORY    Otherwise unknown please see Dr. Valadez's note for additional details    Given known subglottic stenosis, and no previous management.  Formal therapy evaluation is deferred at this time as many symptoms may resolve following procedural intervention for SGS.  The possible role for behavioral intervention in the future was discussed and patient expressed understanding.  She will be seen in the future as deemed warranted by Dr. Valadez.    No skilled services were provided as a part of today's interaction and there is correspondingly no charge.    Silas Andre M.M., M.A., CCC-SLP  Speech-Language Pathologist  Certificate of Vocology  [phone]    *this report was created in part through the use of computerized dictation software, and though reviewed following completion, some typographic errors may persist.  If there is confusion regarding any of this notes contents, please contact me for clarification.*    "

## 2022-11-21 NOTE — LETTER
"11/21/2022       RE: Deepthi Aj  [address] [facility] [address]     Dear Colleague,    Thank you for referring your patient, Deepthi Aj, to the [facility] [place] at [facility]. Please see a copy of my visit note below.    [facility]  Evaluation report    Clinician: Silas Andre M.M., M.A., CCC/SLP  Seen in conjunction with: Dr. Valadez  Referring physician:  Dr. Bojorquez  Patient: Deepthi Aj  Date of Visit: 11/21/2022    HISTORY  Chief complaint: Deepthi Aj is a 31 year old woman presenting today for evaluation of breathing difficutlies.    Chart Review: Patient has a history of granulomatosis with polyangiitis diagnosed via biopsy of pulmonary nodules with positive ANCA in January of 2022.  This has been managed with prednisone, Bactrim, as well as rituximab infusions in August and September of this year.  Unfortunately despite this management over the past 6 weeks she has been experiencing increased shortness of breath walking around her home, and based on independent research recognized this could represent subglottic stenosis.  She was seen by pulmonology, and bronchoscopy demonstrated an area of stenosis near the level of the glottis.  Patient was referred to our clinic for further evaluation and treatment as needed.  She presents for this today.    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    COUGH/THROAT CLEARING    Onset and inciting incident: Began 45 days ago (early October) gradually    Progression: Worsening over time    States that this problem bothers her \"very much\"    Worsens with: Worse in the morning    Cough is constant but unpredictable    Usually nonproductive    Wakes at night to " "clear throat    her cough/ throat clearing triggers include:  o Talking  o Laughing  o Eating (feel like things getting \"stuck\")  o Mucus  o Lying down  o Being at work    BREATHING    Onset and inciting incident: Approximately 45 days ago (early October) gradually    Progression: Worsening over time    Improves with: nothing (no benefit from inhalers / nebs)    Worsens with: exertion    Frequent throat clearing    Noisy breathing (all the time, but worsens when exerting)    States that she can walk a short distance on a flat surface, but anything beyond this will elicit shortness of breath    ADDITIONAL    Throat discomfort    Onset and inciting incident: 45 days ago    Wakes up feeling like her throat is a \"desert\"    Worsens with exertion    If she is exerting herself more will get similar sensation    Resolves as the day goesn    Patient denies significant dysphagia and dysphonia.     OTHER PERTINENT HISTORY    Otherwise unknown please see Dr. Valadez's note for additional details    Given known subglottic stenosis, and no previous management.  Formal therapy evaluation is deferred at this time as many symptoms may resolve following procedural intervention for SGS.  The possible role for behavioral intervention in the future was discussed and patient expressed understanding.  She will be seen in the future as deemed warranted by Dr. Valadez.    No skilled services were provided as a part of today's interaction and there is correspondingly no charge.    Silas Andre M.M., M.A., CCC-SLP  Speech-Language Pathologist  Certificate of Vocology  [phone]    *this report was created in part through the use of computerized dictation software, and though reviewed following completion, some typographic errors may persist.  If there is confusion regarding any of this notes contents, please contact me for clarification.*        Again, thank you for allowing me to participate in the care of your patient.  "     Sincerely,    Speech Language Pathologist

## 2022-11-22 ENCOUNTER — ANESTHESIA EVENT (OUTPATIENT)
Dept: SURGERY | Facility: CLINIC | Age: 32
End: 2022-11-22
Payer: COMMERCIAL

## 2022-11-22 RX ORDER — PREDNISONE 5 MG/1
7.5 TABLET ORAL DAILY
COMMUNITY

## 2022-11-22 RX ORDER — SULFAMETHOXAZOLE AND TRIMETHOPRIM 400; 80 MG/1; MG/1
1 TABLET ORAL DAILY
COMMUNITY

## 2022-11-22 NOTE — ANESTHESIA PREPROCEDURE EVALUATION
Anesthesia Pre-Procedure Evaluation    Patient: Deepthi Aj   MRN: [id number] : 1990        Procedure : Procedure(s):  Microdirect laryngoscopy with CO2 Laser incision, possible dilation, for subglottic stenosis  steroid injection          Past Medical History:   Diagnosis Date     Difficult intubation      Granulomatosis with polyangiitis (H)      Subglottic stenosis      Uncomplicated asthma       History reviewed. No pertinent surgical history.   Allergies   Allergen Reactions     Metformin Diarrhea and Nausea and Vomiting      Social History     Tobacco Use     Smoking status: Never     Smokeless tobacco: Never   Substance Use Topics     Alcohol use: Yes      Wt Readings from Last 1 Encounters:   22 (!) 207.8 kg (458 lb 1.6 oz)        Anesthesia Evaluation   Pt has had prior anesthetic. Type of anesthetic: Has not been intubated in past and no reported difficult hx of intubation.        ROS/MED HX  ENT/Pulmonary: Comment: Subglottic stenosis 2/2 GPA, on 7.5 mg prednisone for last 2 months    (+) MCKENZIE risk factors, hypertension, obese, Intermittent, asthma     Neurologic:  - neg neurologic ROS     Cardiovascular:     (+) hypertension-----Previous cardiac testing   Echo: Date: 22 Results:  Left ventricular ejection fraction is visually estimated at 60%.   No significant valvular abnormalities were identified.   Pulmonary artery systolic pressure estimate is 27mmHg above RAP.   (Normal) .   Stress Test: Date: Results:    ECG Reviewed: Date: Results:    Cath: Date: Results:      METS/Exercise Tolerance:     Hematologic:  - neg hematologic  ROS     Musculoskeletal:  - neg musculoskeletal ROS     GI/Hepatic:     (+) GERD, Asymptomatic on medication,     Renal/Genitourinary:  - neg Renal ROS     Endo:  - neg endo ROS   (+) Obesity,     Psychiatric/Substance Use:  - neg psychiatric ROS     Infectious Disease:       Malignancy:       Other:               OUTSIDE LABS:  CBC: No results found for:  WBC, HGB, HCT, PLT  BMP: No results found for: NA, POTASSIUM, CHLORIDE, CO2, BUN, CR, GLC  COAGS: No results found for: PTT, INR, FIBR  POC: No results found for: BGM, HCG, HCGS  HEPATIC: No results found for: ALBUMIN, PROTTOTAL, ALT, AST, GGT, ALKPHOS, BILITOTAL, BILIDIRECT, KELLY  OTHER: No results found for: PH, LACT, A1C, EBONIE, PHOS, MAG, LIPASE, AMYLASE, TSH, T4, T3, CRP, SED    Anesthesia Plan    ASA Status:  3   NPO Status:  NPO Appropriate    Anesthesia Type: General.     - Airway: ETT   Induction: Intravenous.   Maintenance: TIVA.        Consents            Postoperative Care    Pain management: IV analgesics, Oral pain medications, Multi-modal analgesia.   PONV prophylaxis: Ondansetron (or other 5HT-3), Dexamethasone or Solumedrol, Background Propofol Infusion     Comments:                Zev Yeh MD

## 2022-11-23 ENCOUNTER — HOSPITAL ENCOUNTER (OUTPATIENT)
Facility: CLINIC | Age: 32
Discharge: HOME | End: 2022-11-23
Attending: OTOLARYNGOLOGY | Admitting: OTOLARYNGOLOGY
Payer: COMMERCIAL

## 2022-11-23 ENCOUNTER — ANESTHESIA (OUTPATIENT)
Dept: SURGERY | Facility: CLINIC | Age: 32
End: 2022-11-23
Payer: COMMERCIAL

## 2022-11-23 VITALS
WEIGHT: 293 LBS | DIASTOLIC BLOOD PRESSURE: 59 MMHG | HEIGHT: 71 IN | OXYGEN SATURATION: 97 % | TEMPERATURE: 97.5 F | BODY MASS INDEX: 41.02 KG/M2 | RESPIRATION RATE: 16 BRPM | SYSTOLIC BLOOD PRESSURE: 132 MMHG | HEART RATE: 57 BPM

## 2022-11-23 LAB — GLUCOSE BLDC GLUCOMTR-MCNC: 94 MG/DL (ref 70–99)

## 2022-11-23 PROCEDURE — 710N000010 HC RECOVERY PHASE 1, LEVEL 2, PER MIN: Performed by: OTOLARYNGOLOGY

## 2022-11-23 PROCEDURE — 999N000141 HC STATISTIC PRE-PROCEDURE NURSING ASSESSMENT: Performed by: OTOLARYNGOLOGY

## 2022-11-23 PROCEDURE — 250N000011 HC RX IP 250 OP 636: Performed by: OTOLARYNGOLOGY

## 2022-11-23 PROCEDURE — 258N000003 HC RX IP 258 OP 636: Performed by: STUDENT IN AN ORGANIZED HEALTH CARE EDUCATION/TRAINING PROGRAM

## 2022-11-23 PROCEDURE — 272N000001 HC OR GENERAL SUPPLY STERILE: Performed by: OTOLARYNGOLOGY

## 2022-11-23 PROCEDURE — 370N000017 HC ANESTHESIA TECHNICAL FEE, PER MIN: Performed by: OTOLARYNGOLOGY

## 2022-11-23 PROCEDURE — 31541 LARYNSCOP W/TUMR EXC + SCOPE: CPT | Mod: GC | Performed by: STUDENT IN AN ORGANIZED HEALTH CARE EDUCATION/TRAINING PROGRAM

## 2022-11-23 PROCEDURE — 360N000077 HC SURGERY LEVEL 4, PER MIN: Performed by: OTOLARYNGOLOGY

## 2022-11-23 PROCEDURE — 250N000011 HC RX IP 250 OP 636: Performed by: STUDENT IN AN ORGANIZED HEALTH CARE EDUCATION/TRAINING PROGRAM

## 2022-11-23 PROCEDURE — 31571 LARYNGOSCOP W/VC INJ + SCOPE: CPT | Mod: 51 | Performed by: STUDENT IN AN ORGANIZED HEALTH CARE EDUCATION/TRAINING PROGRAM

## 2022-11-23 PROCEDURE — 250N000009 HC RX 250: Performed by: STUDENT IN AN ORGANIZED HEALTH CARE EDUCATION/TRAINING PROGRAM

## 2022-11-23 PROCEDURE — 250N000013 HC RX MED GY IP 250 OP 250 PS 637: Performed by: STUDENT IN AN ORGANIZED HEALTH CARE EDUCATION/TRAINING PROGRAM

## 2022-11-23 PROCEDURE — C1726 CATH, BAL DIL, NON-VASCULAR: HCPCS | Performed by: OTOLARYNGOLOGY

## 2022-11-23 PROCEDURE — 31528 LARYNGOSCOPY AND DILATION: CPT | Mod: 51 | Performed by: STUDENT IN AN ORGANIZED HEALTH CARE EDUCATION/TRAINING PROGRAM

## 2022-11-23 PROCEDURE — 82962 GLUCOSE BLOOD TEST: CPT

## 2022-11-23 PROCEDURE — 710N000012 HC RECOVERY PHASE 2, PER MINUTE: Performed by: OTOLARYNGOLOGY

## 2022-11-23 RX ORDER — HYDROMORPHONE HCL IN WATER/PF 6 MG/30 ML
0.2 PATIENT CONTROLLED ANALGESIA SYRINGE INTRAVENOUS EVERY 5 MIN PRN
Status: DISCONTINUED | OUTPATIENT
Start: 2022-11-23 | End: 2022-11-23 | Stop reason: HOSPADM

## 2022-11-23 RX ORDER — SODIUM CHLORIDE, SODIUM LACTATE, POTASSIUM CHLORIDE, CALCIUM CHLORIDE 600; 310; 30; 20 MG/100ML; MG/100ML; MG/100ML; MG/100ML
INJECTION, SOLUTION INTRAVENOUS CONTINUOUS
Status: DISCONTINUED | OUTPATIENT
Start: 2022-11-23 | End: 2022-11-23 | Stop reason: HOSPADM

## 2022-11-23 RX ORDER — AMPICILLIN AND SULBACTAM 1; .5 G/1; G/1
1.5 INJECTION, POWDER, FOR SOLUTION INTRAMUSCULAR; INTRAVENOUS SEE ADMIN INSTRUCTIONS
Status: DISCONTINUED | OUTPATIENT
Start: 2022-11-23 | End: 2022-11-23

## 2022-11-23 RX ORDER — PROPOFOL 10 MG/ML
INJECTION, EMULSION INTRAVENOUS PRN
Status: DISCONTINUED | OUTPATIENT
Start: 2022-11-23 | End: 2022-11-23

## 2022-11-23 RX ORDER — EPINEPHRINE IN SOD CHLOR,ISO 1 MG/10 ML
SYRINGE (ML) INTRAVENOUS PRN
Status: DISCONTINUED | OUTPATIENT
Start: 2022-11-23 | End: 2022-11-23 | Stop reason: HOSPADM

## 2022-11-23 RX ORDER — ACETAMINOPHEN 325 MG/1
975 TABLET ORAL ONCE
Status: COMPLETED | OUTPATIENT
Start: 2022-11-23 | End: 2022-11-23

## 2022-11-23 RX ORDER — ONDANSETRON 2 MG/ML
INJECTION INTRAMUSCULAR; INTRAVENOUS PRN
Status: DISCONTINUED | OUTPATIENT
Start: 2022-11-23 | End: 2022-11-23

## 2022-11-23 RX ORDER — DEXAMETHASONE SODIUM PHOSPHATE 4 MG/ML
10 INJECTION, SOLUTION INTRA-ARTICULAR; INTRALESIONAL; INTRAMUSCULAR; INTRAVENOUS; SOFT TISSUE ONCE
Status: DISCONTINUED | OUTPATIENT
Start: 2022-11-23 | End: 2022-11-23 | Stop reason: HOSPADM

## 2022-11-23 RX ORDER — AMPICILLIN AND SULBACTAM 2; 1 G/1; G/1
3 INJECTION, POWDER, FOR SOLUTION INTRAMUSCULAR; INTRAVENOUS SEE ADMIN INSTRUCTIONS
Status: DISCONTINUED | OUTPATIENT
Start: 2022-11-23 | End: 2022-11-23 | Stop reason: HOSPADM

## 2022-11-23 RX ORDER — ONDANSETRON 2 MG/ML
4 INJECTION INTRAMUSCULAR; INTRAVENOUS EVERY 30 MIN PRN
Status: DISCONTINUED | OUTPATIENT
Start: 2022-11-23 | End: 2022-11-23 | Stop reason: HOSPADM

## 2022-11-23 RX ORDER — FENTANYL CITRATE 50 UG/ML
50 INJECTION, SOLUTION INTRAMUSCULAR; INTRAVENOUS EVERY 5 MIN PRN
Status: DISCONTINUED | OUTPATIENT
Start: 2022-11-23 | End: 2022-11-23 | Stop reason: HOSPADM

## 2022-11-23 RX ORDER — GLYCOPYRROLATE 0.2 MG/ML
INJECTION, SOLUTION INTRAMUSCULAR; INTRAVENOUS PRN
Status: DISCONTINUED | OUTPATIENT
Start: 2022-11-23 | End: 2022-11-23

## 2022-11-23 RX ORDER — LABETALOL HYDROCHLORIDE 5 MG/ML
10 INJECTION, SOLUTION INTRAVENOUS
Status: DISCONTINUED | OUTPATIENT
Start: 2022-11-23 | End: 2022-11-23 | Stop reason: HOSPADM

## 2022-11-23 RX ORDER — AMPICILLIN AND SULBACTAM 1; .5 G/1; G/1
INJECTION, POWDER, FOR SOLUTION INTRAMUSCULAR; INTRAVENOUS PRN
Status: DISCONTINUED | OUTPATIENT
Start: 2022-11-23 | End: 2022-11-23

## 2022-11-23 RX ORDER — FENTANYL CITRATE 50 UG/ML
25 INJECTION, SOLUTION INTRAMUSCULAR; INTRAVENOUS EVERY 5 MIN PRN
Status: DISCONTINUED | OUTPATIENT
Start: 2022-11-23 | End: 2022-11-23 | Stop reason: HOSPADM

## 2022-11-23 RX ORDER — ONDANSETRON 4 MG/1
4 TABLET, ORALLY DISINTEGRATING ORAL EVERY 30 MIN PRN
Status: DISCONTINUED | OUTPATIENT
Start: 2022-11-23 | End: 2022-11-23 | Stop reason: HOSPADM

## 2022-11-23 RX ORDER — PROPOFOL 10 MG/ML
INJECTION, EMULSION INTRAVENOUS CONTINUOUS PRN
Status: DISCONTINUED | OUTPATIENT
Start: 2022-11-23 | End: 2022-11-23

## 2022-11-23 RX ORDER — LIDOCAINE 40 MG/G
CREAM TOPICAL
Status: DISCONTINUED | OUTPATIENT
Start: 2022-11-23 | End: 2022-11-23 | Stop reason: HOSPADM

## 2022-11-23 RX ORDER — SODIUM CHLORIDE, SODIUM LACTATE, POTASSIUM CHLORIDE, CALCIUM CHLORIDE 600; 310; 30; 20 MG/100ML; MG/100ML; MG/100ML; MG/100ML
INJECTION, SOLUTION INTRAVENOUS CONTINUOUS PRN
Status: DISCONTINUED | OUTPATIENT
Start: 2022-11-23 | End: 2022-11-23

## 2022-11-23 RX ORDER — KETAMINE HYDROCHLORIDE 10 MG/ML
INJECTION INTRAMUSCULAR; INTRAVENOUS PRN
Status: DISCONTINUED | OUTPATIENT
Start: 2022-11-23 | End: 2022-11-23

## 2022-11-23 RX ORDER — AMPICILLIN AND SULBACTAM 2; 1 G/1; G/1
3 INJECTION, POWDER, FOR SOLUTION INTRAMUSCULAR; INTRAVENOUS
Status: COMPLETED | OUTPATIENT
Start: 2022-11-23 | End: 2022-11-23

## 2022-11-23 RX ORDER — DEXAMETHASONE SODIUM PHOSPHATE 4 MG/ML
INJECTION, SOLUTION INTRA-ARTICULAR; INTRALESIONAL; INTRAMUSCULAR; INTRAVENOUS; SOFT TISSUE PRN
Status: DISCONTINUED | OUTPATIENT
Start: 2022-11-23 | End: 2022-11-23

## 2022-11-23 RX ORDER — TRIAMCINOLONE ACETONIDE 40 MG/ML
INJECTION, SUSPENSION INTRA-ARTICULAR; INTRAMUSCULAR PRN
Status: DISCONTINUED | OUTPATIENT
Start: 2022-11-23 | End: 2022-11-23 | Stop reason: HOSPADM

## 2022-11-23 RX ORDER — LIDOCAINE 40 MG/G
CREAM TOPICAL
Status: CANCELLED | OUTPATIENT
Start: 2022-11-23

## 2022-11-23 RX ORDER — FENTANYL CITRATE 50 UG/ML
INJECTION, SOLUTION INTRAMUSCULAR; INTRAVENOUS PRN
Status: DISCONTINUED | OUTPATIENT
Start: 2022-11-23 | End: 2022-11-23

## 2022-11-23 RX ORDER — HYDROMORPHONE HCL IN WATER/PF 6 MG/30 ML
0.4 PATIENT CONTROLLED ANALGESIA SYRINGE INTRAVENOUS EVERY 5 MIN PRN
Status: DISCONTINUED | OUTPATIENT
Start: 2022-11-23 | End: 2022-11-23 | Stop reason: HOSPADM

## 2022-11-23 RX ORDER — LIDOCAINE HYDROCHLORIDE 20 MG/ML
INJECTION, SOLUTION INFILTRATION; PERINEURAL PRN
Status: DISCONTINUED | OUTPATIENT
Start: 2022-11-23 | End: 2022-11-23

## 2022-11-23 RX ADMIN — GLYCOPYRROLATE 0.1 MG: 0.2 INJECTION, SOLUTION INTRAMUSCULAR; INTRAVENOUS at 08:48

## 2022-11-23 RX ADMIN — Medication 20 MG: at 08:22

## 2022-11-23 RX ADMIN — LIDOCAINE HYDROCHLORIDE 100 MG: 20 INJECTION, SOLUTION INFILTRATION; PERINEURAL at 07:59

## 2022-11-23 RX ADMIN — AMPICILLIN SODIUM AND SULBACTAM SODIUM 3 G: 1; .5 INJECTION, POWDER, FOR SOLUTION INTRAMUSCULAR; INTRAVENOUS at 09:07

## 2022-11-23 RX ADMIN — SODIUM CHLORIDE, POTASSIUM CHLORIDE, SODIUM LACTATE AND CALCIUM CHLORIDE: 600; 310; 30; 20 INJECTION, SOLUTION INTRAVENOUS at 06:54

## 2022-11-23 RX ADMIN — SUGAMMADEX 800 MG: 100 INJECTION, SOLUTION INTRAVENOUS at 09:27

## 2022-11-23 RX ADMIN — ACETAMINOPHEN 975 MG: 325 TABLET, FILM COATED ORAL at 06:45

## 2022-11-23 RX ADMIN — PROPOFOL 200 MG: 10 INJECTION, EMULSION INTRAVENOUS at 08:00

## 2022-11-23 RX ADMIN — Medication 50 MG: at 08:58

## 2022-11-23 RX ADMIN — PROPOFOL 100 MCG/KG/MIN: 10 INJECTION, EMULSION INTRAVENOUS at 08:07

## 2022-11-23 RX ADMIN — Medication 20 MG: at 08:33

## 2022-11-23 RX ADMIN — Medication 50 MG: at 07:59

## 2022-11-23 RX ADMIN — AMPICILLIN SODIUM AND SULBACTAM SODIUM 3 G: 2; 1 INJECTION, POWDER, FOR SOLUTION INTRAMUSCULAR; INTRAVENOUS at 06:54

## 2022-11-23 RX ADMIN — SODIUM CHLORIDE, POTASSIUM CHLORIDE, SODIUM LACTATE AND CALCIUM CHLORIDE: 600; 310; 30; 20 INJECTION, SOLUTION INTRAVENOUS at 07:37

## 2022-11-23 RX ADMIN — Medication 100 MG: at 08:03

## 2022-11-23 RX ADMIN — FENTANYL CITRATE 50 MCG: 50 INJECTION, SOLUTION INTRAMUSCULAR; INTRAVENOUS at 07:59

## 2022-11-23 RX ADMIN — MIDAZOLAM 2 MG: 1 INJECTION INTRAMUSCULAR; INTRAVENOUS at 07:43

## 2022-11-23 RX ADMIN — ONDANSETRON 4 MG: 2 INJECTION INTRAMUSCULAR; INTRAVENOUS at 09:29

## 2022-11-23 RX ADMIN — DEXAMETHASONE SODIUM PHOSPHATE 10 MG: 4 INJECTION, SOLUTION INTRA-ARTICULAR; INTRALESIONAL; INTRAMUSCULAR; INTRAVENOUS; SOFT TISSUE at 08:11

## 2022-11-23 ASSESSMENT — ACTIVITIES OF DAILY LIVING (ADL)
ADLS_ACUITY_SCORE: 18

## 2022-11-23 NOTE — ANESTHESIA PROCEDURE NOTES
Airway       Patient location during procedure: OR       Procedure Start/Stop Times: 11/23/2022 8:08 AM  Staff -        Resident/Fellow: Zev Yeh MD       Performed By: resident  Consent for Airway        Urgency: elective  Indications and Patient Condition       Indications for airway management: barbara-procedural       Induction type:intravenous       Mask difficulty assessment: 1 - vent by mask    Final Airway Details       Final airway type: endotracheal airway       Successful airway: ETT - single  Endotracheal Airway Details        ETT size (mm): 5.0       Cuffed: yes       Successful intubation technique: direct laryngoscopy (rigid bronchoscopy by ENT providers)       DL Blade Type: MAC 4       Grade View of Cords: 1       Adjucts: stylet       Position: Center       Measured from: gums/teeth       Bite block used: None    Post intubation assessment        Placement verified by: capnometry, equal breath sounds and chest rise        Number of attempts at approach: 1       Number of other approaches attempted: 0       Ease of procedure: easy       Dentition: Intact       Dental guard used and removed. Dental Guard Type: Standard White.    Medication(s) Administered   Medication Administration Time: 11/23/2022 8:08 AM

## 2022-11-23 NOTE — BRIEF OP NOTE
[facility]    Brief Operative Note    Pre-operative diagnosis: Subglottic stenosis [J38.6]  Granulomatosis with polyangiitis without renal involvement (H) [M31.30]  Morbid obesity (H) [E66.01]  Dysphonia [R49.0]  Post-operative diagnosis Same as pre-operative diagnosis    Procedure: Procedure(s):  Microdirect laryngoscopy with CO2 Laser incision, dilation, for subglottic stenosis  steroid injection  Surgeon: Surgeon(s) and Role:     * Kelsey Valadez MD - Primary     * Fredrick Almendarez MD - Resident - Assisting  Anesthesia: General   Estimated Blood Loss: Minimal    Drains: None  Specimens: * No specimens in log *  Findings:   4 mm airway, lasered and dilated to 13.5 mm.  Complications: None.  Implants: * No implants in log *

## 2022-11-23 NOTE — PROGRESS NOTES
Patient provided copy/photo of negative covid test upon arrival to pre-procedure area.      Maru Alex RN on 11/23/2022 at 7:53 AM

## 2022-11-23 NOTE — ANESTHESIA CARE TRANSFER NOTE
Patient: Deepthi Aj    Procedure: Procedure(s):  Microdirect laryngoscopy with CO2 Laser incision, dilation, for subglottic stenosis  steroid injection       Diagnosis: Subglottic stenosis [J38.6]  Granulomatosis with polyangiitis without renal involvement (H) [M31.30]  Morbid obesity (H) [E66.01]  Dysphonia [R49.0]  Diagnosis Additional Information: No value filed.    Anesthesia Type:   General     Note:    Oropharynx: oropharynx clear of all foreign objects and spontaneously breathing  Level of Consciousness: awake  Oxygen Supplementation: face mask  Level of Supplemental Oxygen (L/min / FiO2): 4  Independent Airway: airway patency satisfactory and stable  Dentition: dentition unchanged  Vital Signs Stable: post-procedure vital signs reviewed and stable  Report to RN Given: handoff report given  Patient transferred to: PACU    Handoff Report: Identifed the Patient, Identified the Reponsible Provider, Reviewed the pertinent medical history, Discussed the surgical course, Reviewed Intra-OP anesthesia mangement and issues during anesthesia, Set expectations for post-procedure period and Allowed opportunity for questions and acknowledgement of understanding      Vitals:  Vitals Value Taken Time   BP     Temp     Pulse 75 11/23/22 0948   Resp     SpO2 98 % 11/23/22 0948   Vitals shown include unvalidated device data.    Electronically Signed By: Zev Yeh MD  November 23, 2022  9:49 AM

## 2022-11-23 NOTE — OP NOTE
NAME: Deepthi Aj    MEDICAL RECORD NUMBER: [id number]    YOB: 1990    DATE OF SURGERY: 11/23/2022    SURGEON: Kelsey Valadez MD    ASSISTANT SURGEON: Fredrick Almendarez MD    PREOPERATIVE DIAGNOSIS: subglottic stenosis    POSTOPERATIVE DIAGNOSIS: same    PROCEDURE: Microdirect laryngoscopy with CO2 Laser incision, steroid injection, and dilation, for subglottic stenosis    INDICATIONS: Patient is a 32-year-old female with granulomatosis with polyangiitis who has recently developed shortness of breath with activity.  Flexible laryngoscopy exam demonstrated subglottic stenosis.  Patient is indicated for the above surgery.  Risks and benefits were discussed and patient elected to proceed.    ANESTHESIA: General; intermittent endotracheal intubation.  The apnea, and intermittent jet ventilation    FINDINGS: Easy mask. Good laryngeal exposure with Ossoff-Pilling laryngoscope. Stenosis began ~8 mm below true vocal folds, extended ~11 mm. Diameter 4 mm airway, lasered and dilated to 13.5 mm. 1.5 mL of Kenalog injected    EBL: 2 cc    SPECIMENS: None    COMPLICATIONS: None     DESCRIPTION OF PROCEDURE: Patient was taken to the operating room and positioned supine on the operating table.  Thermoplastic guards were placed on the upper and lower teeth to protect them.  The patient was draped in usual clean fashion.  A timeout was performed.  The patient was induced under general anesthesia and was able to be bagged mask ventilated without issue.  At this point paralysis was given by the anesthesia team.  The Ossoff laryngoscope was passed atraumatically and placed to suspend the glottis.  The 0 degree [place] valery was used for direct visualization and the stenosis was measured.  Microscope was brought in.  A second timeout for the laser was performed and the patient was covered with damp towels. The CO2 laser was used under microscopic visualization. The anterior portion of the stenosis was incised  and ablated to good effect. High frequency jet ventilation was used to try to slow the patient's desaturations, which were rapid due to habitus.  When the stenosis was opened nicely, Kenalog was injected submucosally under telescopic visualization and the stenosis was then gently dilated under telescopic visualization.  The airway was suctioned clean with a flexible tracheal suction.  This completed the procedure.  The mouth guards were removed.  A 5-0 endotracheal tube was placed through the glottis and the and the patient was taken out of suspension and the laryngoscope was removed atraumatically with the tube kept in position.  The patient was awakened from anesthesia and extubated without issue.  She was taken to PACU in good condition.      DISPOSITION: Home    Fredrick Almendarez MD        ATTENDING PRESENCE STATEMENT: I was present and participating for all portions of the procedure from beginning to completion.   Kelsey Valadez MD FACS

## 2022-11-23 NOTE — ANESTHESIA POSTPROCEDURE EVALUATION
Patient: Deepthi Aj    Procedure: Procedure(s):  Microdirect laryngoscopy with CO2 Laser incision, dilation, for subglottic stenosis  steroid injection       Anesthesia Type:  General    Note:  Disposition: Outpatient   Postop Pain Control: Uneventful            Sign Out: Well controlled pain   PONV: No   Neuro/Psych: Uneventful            Sign Out: Acceptable/Baseline neuro status   Airway/Respiratory: Uneventful            Sign Out: Acceptable/Baseline resp. status   CV/Hemodynamics: Uneventful            Sign Out: Acceptable CV status; No obvious hypovolemia; No obvious fluid overload   Other NRE: NONE   DID A NON-ROUTINE EVENT OCCUR? No           Last vitals:  Vitals Value Taken Time   /61 11/23/22 1000   Temp 36.4  C (97.5  F) 11/23/22 0947   Pulse 55 11/23/22 1001   Resp 20 11/23/22 0947   SpO2 98 % 11/23/22 1001   Vitals shown include unvalidated device data.    Electronically Signed By: Viral Adorno MD  November 23, 2022  10:03 AM

## 2022-11-23 NOTE — DISCHARGE INSTRUCTIONS
[facility]  Same-Day Surgery   Adult Discharge Orders & Instructions   For 24 hours after surgery    Get plenty of rest.  A responsible adult must stay with you for at least 24 hours after you leave the hospital.   Do not drive or use heavy equipment.  If you have weakness or tingling, don't drive or use heavy equipment until this feeling goes away.  Do not drink alcohol.  Avoid strenuous or risky activities.  Ask for help when climbing stairs.   You may feel lightheaded.  IF so, sit for a few minutes before standing.  Have someone help you get up.   If you have nausea (feel sick to your stomach): Drink only clear liquids such as apple juice, ginger ale, broth or 7-Up.  Rest may also help.  Be sure to drink enough fluids.  Move to a regular diet as you feel able.  You may have a slight fever. Call the doctor if your fever is over 100 F (37.7 C) (taken under the tongue) or lasts longer than 24 hours.  You may have a dry mouth, a sore throat, muscle aches or trouble sleeping.  These should go away after 24 hours.  Do not make important or legal decisions.   Call your doctor for any of the followin.  Signs of infection (fever, growing tenderness at the surgery site, a large amount of drainage or bleeding, severe pain, foul-smelling drainage, redness, swelling).    2. It has been over 8 to 10 hours since surgery and you are still not able to urinate (pass water).    3.  Headache for over 24 hours.    To contact a doctor, call  Dr Valadez at  the [facility] Clinic at [phone] or:    '   [phone] and ask for the resident on call for ENT (answered 24 hours a day)  '   Emergency Department:  [facility]: [phone]

## 2022-12-19 ENCOUNTER — OFFICE VISIT (OUTPATIENT)
Dept: OTOLARYNGOLOGY | Facility: CLINIC | Age: 32
End: 2022-12-19
Payer: COMMERCIAL

## 2022-12-19 VITALS
OXYGEN SATURATION: 98 % | HEART RATE: 80 BPM | WEIGHT: 293 LBS | BODY MASS INDEX: 41.02 KG/M2 | SYSTOLIC BLOOD PRESSURE: 147 MMHG | HEIGHT: 71 IN | DIASTOLIC BLOOD PRESSURE: 97 MMHG

## 2022-12-19 DIAGNOSIS — M31.30 GRANULOMATOSIS WITH POLYANGIITIS WITHOUT RENAL INVOLVEMENT (H): Primary | ICD-10-CM

## 2022-12-19 DIAGNOSIS — J38.6 SUBGLOTTIC STENOSIS: ICD-10-CM

## 2022-12-19 PROCEDURE — 31622 DX BRONCHOSCOPE/WASH: CPT | Performed by: OTOLARYNGOLOGY

## 2022-12-19 PROCEDURE — 99214 OFFICE O/P EST MOD 30 MIN: CPT | Mod: 25 | Performed by: OTOLARYNGOLOGY

## 2022-12-19 ASSESSMENT — PAIN SCALES - GENERAL: PAINLEVEL: NO PAIN (0)

## 2022-12-19 NOTE — LETTER
12/19/2022      RE: Deepthi Aj  [address] [facility] [address]       Dear Colleague:    Deepthi Aj recently returned for follow-up at the [facility]. My clinic note from our visit is enclosed below.  Speech recognition software may have been used in the documentation below; input is reviewed before signature to the best of my ability.     I appreciate the ongoing opportunity to participate in this patient's care.    Please feel free to contact me with any questions.    Sincerely yours,      Kelsey Valadez M.D., M.P.H.  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     Procedures:  11/23/22: Microdirect laryngoscopy with CO2 Laser incision, steroid injection, and dilation, for subglottic stenosis    She returns for a post-op check. Her breathing is feeling much better. Peak flows are now around 500. She has had no problems post-operatively.     She saw her rheumatologist last Friday. ESR is slowly declining. ANCA is undetectable. Liver panel is normal.      MEDICATIONS:     Current Outpatient Medications   Medication Sig Dispense Refill     Avacopan 10 MG CAPS 1 capsule       budesonide (PULMICORT) 0.5 MG/2ML neb solution Take 2 mLs (0.5 mg) by nebulization as needed (as needed up to 4 times daily to help manage subglottic stenosis) 240 mL 4     Calcium Carb-Cholecalciferol (CALCIUM 1000 + D) 1000-20 MG-MCG TABS Take 1 tablet by mouth daily       cholecalciferol 125 MCG (5000 UT) CAPS Take 1 capsule by mouth 2 times daily       labetalol (NORMODYNE) 200 MG tablet Take 200 mg by mouth 2 times daily       naproxen (NAPROSYN) 500 MG tablet TAKE ONE TABLET BY MOUTH TWICE DAILY AS NEEDED       omeprazole (PRILOSEC) 20 MG DR capsule Take  20 mg by mouth daily       predniSONE (DELTASONE) 5 MG tablet Take 7.5 mg by mouth daily       riTUXimab (RITUXAN) 10 MG/ML injection        sodium chloride 0.9 % neb solution Take 5 mLs by nebulization as needed for other (as needed up to 4 times per day to manage subglottic stenosis) 600 mL 4     sulfamethoxazole-trimethoprim (BACTRIM) 400-80 MG tablet Take 1 tablet by mouth daily       vitamin C (ASCORBIC ACID) 500 MG tablet Take 500 mg by mouth daily         ALLERGIES:    Allergies   Allergen Reactions     Metformin Diarrhea and Nausea and Vomiting       NEW PMH/PSH: None    REVIEW OF SYSTEMS:  The patient completed a comprehensive 11 point review of systems (below), which was reviewed. Positives are as noted below.  Patient Supplied Answers to Review of Systems   ENT ROS 11/18/2022   Cardiopulmonary: Breathing problems, Wheezing       PHYSICAL EXAM:  General: The patient was alert and conversant, and in no acute distress.    Oral cavity/oropharynx: No masses or lesions. Dentition unchanged since prior. Tongue mobility and palate elevation intact and symmetric.  Neck: No palpable cervical lymphadenopathy, no significant tenderness to palpation of the thyrohyoid space, which was mildly narrow. No obvious thyroid abnormality.  Resp: Breathing comfortably, no stridor or stertor.  Neuro: Symmetric facial function. Other cranial nerve function as documented above.  Psych: Normal affect, pleasant and cooperative.  Voice/speech: No significant dysphonia.      Intake scores  Last 2 Scores for Patient-Answered VHI Questionnaire  No flowsheet data found.   Last 2 Scores for Patient-Answered EAT Questionnaire  No flowsheet data found.     Last 2 Scores for Patient-Answered CSI Questionnaire  CSI Total Score 12/16/2022   CSI Total Score 0       Procedure:   Flexible Bronchoscopy (17933)  Indications: This procedure was warranted to evaluate the patient's airway anatomy. Risks, benefits, and alternatives were  discussed.  Description: After informed consent was obtained, a time-out was performed to confirm patient identity, procedure, and procedure site. Topical 3% lidocaine with 0.25% phenylephrine was applied to the nasal cavities and oropharynx. 3 ml of 2% lidocaine was delivered via catheter through the channel of the endoscope and the patient was instructed to cough and expectorate into the suction. I performed the endoscopy and no complications were apparent.  Performed by: Kelsey Valadez MD MPH  Findings: Glottis patent. Subglottis with mild stenosis, sticky secretions, no granulation. Trachea patent. Kim sharp. Unremarkable takeoffs of mainstem bronchi.                                  IMPRESSION AND PLAN:   Deepthi Aj returns with much improved airway caliber and she is breathing much better.    We discussed the option of flexible steroid injections under anesthesia for an option to try to maximize the duration of airway patency before a next procedure would be needed under general anesthesia. She understands this is a goal, not a guarantee, and would like to pursue this option. Case request was placed.    She will also continue her rheumatology treatment. Her next infusion is planned for later this month.    I appreciate the opportunity to participate in the care of this pleasant patient.     Today's visit required additional screening time, PPE, and cleaning measures to allow for a safe in-person visit, due to the public health emergency. I spent a total of 33 minutes on 12/19/2022 in chart review, review of tests, patient visit, documentation, care coordination, and/or discussion with other providers about the issues documented above, separate from any documented procedure(s).        Kelsey Valadez MD

## 2022-12-19 NOTE — PATIENT INSTRUCTIONS
1.  You were seen in the ENT Clinic today by . If you have any questions or concerns after your appointment, please call [phone]. Press option #1 for scheduling related needs. Press option #3 for Nurse advice.    2.   has recommended the following:   - continue rheumatology treatment   - schedule steroid injection. Surgery scheduler will contact you with options    3.  Plan is to return to clinic for post operative follow up. KELLE Tang LPN  [phone]  [facility] - Otolaryngology

## 2022-12-19 NOTE — Clinical Note
12/19/2022       RE: Deepthi Aj  [address] [facility] [address]     Dear Colleague,    Thank you for referring your patient, Deepthi Aj, to the [facility] EAR NOSE AND THROAT CLINIC [place] at [facility]. Please see a copy of my visit note below.    Dear Colleague:    Deepthi Aj recently returned for follow-up at the [facility]. My clinic note from our visit is enclosed below.  Speech recognition software may have been used in the documentation below; input is reviewed before signature to the best of my ability.     I appreciate the ongoing opportunity to participate in this patient's care.    Please feel free to contact me with any questions.    Sincerely yours,      Kelsey Valadez M.D., M.P.H.  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     Procedures:  11/23/22: Microdirect laryngoscopy with CO2 Laser incision, steroid injection, and dilation, for subglottic stenosis    She returns for a post-op check. Her breathing is feeling much better. Peak flows are now around 500. She has had no problems post-operatively.     She saw her rheumatologist last Friday. ESR is slowly declining. ANCA is undetectable. Liver panel is normal.      MEDICATIONS:     Current Outpatient Medications   Medication Sig Dispense Refill     Avacopan 10 MG CAPS 1 capsule       budesonide (PULMICORT) 0.5 MG/2ML neb solution Take 2 mLs (0.5 mg) by nebulization as needed (as needed up to 4 times daily to help manage subglottic stenosis) 240 mL 4     Calcium Carb-Cholecalciferol (CALCIUM 1000 + D) 1000-20 MG-MCG TABS Take 1 tablet by mouth daily       cholecalciferol 125 MCG (5000 UT) CAPS Take  1 capsule by mouth 2 times daily       labetalol (NORMODYNE) 200 MG tablet Take 200 mg by mouth 2 times daily       naproxen (NAPROSYN) 500 MG tablet TAKE ONE TABLET BY MOUTH TWICE DAILY AS NEEDED       omeprazole (PRILOSEC) 20 MG DR capsule Take 20 mg by mouth daily       predniSONE (DELTASONE) 5 MG tablet Take 7.5 mg by mouth daily       riTUXimab (RITUXAN) 10 MG/ML injection        sodium chloride 0.9 % neb solution Take 5 mLs by nebulization as needed for other (as needed up to 4 times per day to manage subglottic stenosis) 600 mL 4     sulfamethoxazole-trimethoprim (BACTRIM) 400-80 MG tablet Take 1 tablet by mouth daily       vitamin C (ASCORBIC ACID) 500 MG tablet Take 500 mg by mouth daily         ALLERGIES:    Allergies   Allergen Reactions     Metformin Diarrhea and Nausea and Vomiting       NEW PMH/PSH: None    REVIEW OF SYSTEMS:  The patient completed a comprehensive 11 point review of systems (below), which was reviewed. Positives are as noted below.  Patient Supplied Answers to Review of Systems   ENT ROS 11/18/2022   Cardiopulmonary: Breathing problems, Wheezing       PHYSICAL EXAM:  General: The patient was alert and conversant, and in no acute distress.    Oral cavity/oropharynx: No masses or lesions. Dentition unchanged since prior. Tongue mobility and palate elevation intact and symmetric.  Neck: No palpable cervical lymphadenopathy, no significant tenderness to palpation of the thyrohyoid space, which was mildly narrow. No obvious thyroid abnormality.  Resp: Breathing comfortably, no stridor or stertor.  Neuro: Symmetric facial function. Other cranial nerve function as documented above.  Psych: Normal affect, pleasant and cooperative.  Voice/speech: No significant dysphonia.      Intake scores  Last 2 Scores for Patient-Answered VHI Questionnaire  No flowsheet data found.   Last 2 Scores for Patient-Answered EAT Questionnaire  No flowsheet data found.     Last 2 Scores for Patient-Answered CSI  Questionnaire  CSI Total Score 12/16/2022   CSI Total Score 0       Procedure:   Flexible Bronchoscopy (88900)  Indications: This procedure was warranted to evaluate the patient's airway anatomy. Risks, benefits, and alternatives were discussed.  Description: After informed consent was obtained, a time-out was performed to confirm patient identity, procedure, and procedure site. Topical 3% lidocaine with 0.25% phenylephrine was applied to the nasal cavities and oropharynx. 3 ml of 2% lidocaine was delivered via catheter through the channel of the endoscope and the patient was instructed to cough and expectorate into the suction. I performed the endoscopy and no complications were apparent.  Performed by: Kelsey Valadez MD MPH  Findings: Glottis patent. Subglottis with mild stenosis, sticky secretions, no granulation. Trachea patent. Kim sharp. Unremarkable takeoffs of mainstem bronchi.                                  IMPRESSION AND PLAN:   Deepthi Aj returns with much improved airway caliber and she is breathing much better.    We discussed the option of flexible steroid injections under anesthesia for an option to try to maximize the duration of airway patency before a next procedure would be needed under general anesthesia. She would like to pursue this option. Case request was placed.    She will also continue her rheumatology treatment. Her next infusion is planned for later this month.    I appreciate the opportunity to participate in the care of this pleasant patient.     ***Today's visit required additional screening time, PPE, and cleaning measures to allow for a safe in-person visit, due to the public health emergency. I spent a total of *** minutes on ***12/19/2022 in chart review, review of tests, patient visit, documentation, care coordination, and/or discussion with other providers about the issues documented above, separate from any documented procedure(s).      Dear Colleague:    Deepthi BAIG  Jean Marie recently returned for follow-up at the [facility]. My clinic note from our visit is enclosed below.  Speech recognition software may have been used in the documentation below; input is reviewed before signature to the best of my ability.     I appreciate the ongoing opportunity to participate in this patient's care.    Please feel free to contact me with any questions.    Sincerely yours,      Kelsey Valadez M.D., M.P.H.  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     Procedures:  11/23/22: Microdirect laryngoscopy with CO2 Laser incision, steroid injection, and dilation, for subglottic stenosis    She returns for a post-op check. Her breathing is feeling much better. Peak flows are now around 500. She has had no problems post-operatively.     She saw her rheumatologist last Friday. ESR is slowly declining. ANCA is undetectable. Liver panel is normal.      MEDICATIONS:     Current Outpatient Medications   Medication Sig Dispense Refill     Avacopan 10 MG CAPS 1 capsule       budesonide (PULMICORT) 0.5 MG/2ML neb solution Take 2 mLs (0.5 mg) by nebulization as needed (as needed up to 4 times daily to help manage subglottic stenosis) 240 mL 4     Calcium Carb-Cholecalciferol (CALCIUM 1000 + D) 1000-20 MG-MCG TABS Take 1 tablet by mouth daily       cholecalciferol 125 MCG (5000 UT) CAPS Take 1 capsule by mouth 2 times daily       labetalol (NORMODYNE) 200 MG tablet Take 200 mg by mouth 2 times daily       naproxen (NAPROSYN) 500 MG tablet TAKE ONE TABLET BY MOUTH TWICE DAILY AS NEEDED       omeprazole (PRILOSEC) 20 MG DR capsule Take 20 mg by mouth daily       predniSONE (DELTASONE) 5 MG tablet Take 7.5 mg by mouth daily       riTUXimab  (RITUXAN) 10 MG/ML injection        sodium chloride 0.9 % neb solution Take 5 mLs by nebulization as needed for other (as needed up to 4 times per day to manage subglottic stenosis) 600 mL 4     sulfamethoxazole-trimethoprim (BACTRIM) 400-80 MG tablet Take 1 tablet by mouth daily       vitamin C (ASCORBIC ACID) 500 MG tablet Take 500 mg by mouth daily         ALLERGIES:    Allergies   Allergen Reactions     Metformin Diarrhea and Nausea and Vomiting       NEW PMH/PSH: None    REVIEW OF SYSTEMS:  The patient completed a comprehensive 11 point review of systems (below), which was reviewed. Positives are as noted below.  Patient Supplied Answers to Review of Systems   ENT ROS 11/18/2022   Cardiopulmonary: Breathing problems, Wheezing       PHYSICAL EXAM:  General: The patient was alert and conversant, and in no acute distress.    Oral cavity/oropharynx: No masses or lesions. Dentition unchanged since prior. Tongue mobility and palate elevation intact and symmetric.  Neck: No palpable cervical lymphadenopathy, no significant tenderness to palpation of the thyrohyoid space, which was mildly narrow. No obvious thyroid abnormality.  Resp: Breathing comfortably, no stridor or stertor.  Neuro: Symmetric facial function. Other cranial nerve function as documented above.  Psych: Normal affect, pleasant and cooperative.  Voice/speech: No significant dysphonia.      Intake scores  Last 2 Scores for Patient-Answered VHI Questionnaire  No flowsheet data found.   Last 2 Scores for Patient-Answered EAT Questionnaire  No flowsheet data found.     Last 2 Scores for Patient-Answered CSI Questionnaire  CSI Total Score 12/16/2022   CSI Total Score 0       Procedure:   Flexible Bronchoscopy (09483)  Indications: This procedure was warranted to evaluate the patient's airway anatomy. Risks, benefits, and alternatives were discussed.  Description: After informed consent was obtained, a time-out was performed to confirm patient identity,  procedure, and procedure site. Topical 3% lidocaine with 0.25% phenylephrine was applied to the nasal cavities and oropharynx. 3 ml of 2% lidocaine was delivered via catheter through the channel of the endoscope and the patient was instructed to cough and expectorate into the suction. I performed the endoscopy and no complications were apparent.  Performed by: Kelsey Valadez MD MPH  Findings: Glottis patent. Subglottis with mild stenosis, sticky secretions, no granulation. Trachea patent. Kim sharp. Unremarkable takeoffs of mainstem bronchi.                                  IMPRESSION AND PLAN:   Deepthi Aj returns with much improved airway caliber and she is breathing much better.    We discussed the option of flexible steroid injections under anesthesia for an option to try to maximize the duration of airway patency before a next procedure would be needed under general anesthesia. She understands this is a goal, not a guarantee, and would like to pursue this option. Case request was placed.    She will also continue her rheumatology treatment. Her next infusion is planned for later this month.    I appreciate the opportunity to participate in the care of this pleasant patient.     Today's visit required additional screening time, PPE, and cleaning measures to allow for a safe in-person visit, due to the public health emergency. I spent a total of 33 minutes on 12/19/2022 in chart review, review of tests, patient visit, documentation, care coordination, and/or discussion with other providers about the issues documented above, separate from any documented procedure(s).        Again, thank you for allowing me to participate in the care of your patient.      Sincerely,    Kelsey Valadez MD

## 2022-12-23 ENCOUNTER — TELEPHONE (OUTPATIENT)
Dept: OTOLARYNGOLOGY | Facility: CLINIC | Age: 32
End: 2022-12-23

## 2022-12-23 NOTE — TELEPHONE ENCOUNTER
Left message regarding scheduling surgery/procedure with Dr. Valadez.   Writer left call back number on the patients voicemail       Maddie Johnson on 12/23/2022 at 1:20 PM   P: [phone]

## 2022-12-27 ENCOUNTER — HOSPITAL ENCOUNTER (OUTPATIENT)
Facility: AMBULATORY SURGERY CENTER | Age: 32
End: 2022-12-27
Attending: OTOLARYNGOLOGY
Payer: COMMERCIAL

## 2022-12-27 DIAGNOSIS — J38.6 SUBGLOTTIC STENOSIS: ICD-10-CM

## 2022-12-27 DIAGNOSIS — M31.30 GRANULOMATOSIS WITH POLYANGIITIS WITHOUT RENAL INVOLVEMENT (H): ICD-10-CM

## 2022-12-27 NOTE — TELEPHONE ENCOUNTER
Left message regarding scheduling surgery/procedure with Dr. Valadez.   Writer left call back number on the patients voicemail.       Maddie Johnson on 12/27/2022 at 1:16 PM   P: [phone]

## 2022-12-29 NOTE — TELEPHONE ENCOUNTER
Called patient to confirm procedure scheduled at 1/19/2023. Informed patient of approximate arrival time. Patient states she needs to get back home by 2:00 p.m. informed patient writer cannot guarantee anything. Don't anticipate any later, but could be earlier.  Pt understands and will play by ear.    Discussed COVID-19 Testing: Yes - at home COVID testing    Patient aware that pre-op RN will call 2-3 days prior to surgery with arrival time and instructions Yes       Maddie Johnson on 12/29/2022 at 8:54 AM

## 2023-01-18 ENCOUNTER — PATIENT OUTREACH (OUTPATIENT)
Dept: OTOLARYNGOLOGY | Facility: CLINIC | Age: 33
End: 2023-01-18
Payer: COMMERCIAL

## 2023-01-18 NOTE — PROGRESS NOTES
Called and spoke with patient regarding the change in plan for her procedure tomorrow. Advised that we will perform the injection in clinic instead of the ASC OR. Patient agreeable and will come to the clinic for 12:00pm procedure tomorrow.       Mariella Roach, RN, BSN

## 2023-01-19 ENCOUNTER — OFFICE VISIT (OUTPATIENT)
Dept: OTOLARYNGOLOGY | Facility: CLINIC | Age: 33
End: 2023-01-19
Payer: COMMERCIAL

## 2023-01-19 VITALS
WEIGHT: 293 LBS | HEIGHT: 71 IN | HEART RATE: 81 BPM | OXYGEN SATURATION: 96 % | DIASTOLIC BLOOD PRESSURE: 90 MMHG | BODY MASS INDEX: 41.02 KG/M2 | SYSTOLIC BLOOD PRESSURE: 156 MMHG

## 2023-01-19 DIAGNOSIS — J38.6 SUBGLOTTIC STENOSIS: ICD-10-CM

## 2023-01-19 DIAGNOSIS — R49.0 DYSPHONIA: ICD-10-CM

## 2023-01-19 DIAGNOSIS — E66.01 MORBID OBESITY (H): Primary | ICD-10-CM

## 2023-01-19 PROCEDURE — 99207 PR NO CHARGE LOS: CPT | Performed by: OTOLARYNGOLOGY

## 2023-01-19 RX ORDER — CIPROFLOXACIN AND DEXAMETHASONE 3; 1 MG/ML; MG/ML
SUSPENSION/ DROPS AURICULAR (OTIC)
Qty: 7.5 ML | Refills: 11 | Status: SHIPPED | OUTPATIENT
Start: 2023-01-19 | End: 2023-01-29

## 2023-01-19 ASSESSMENT — PAIN SCALES - GENERAL: PAINLEVEL: SEVERE PAIN (6)

## 2023-01-19 NOTE — LETTER
1/19/2023       RE: Deepthi Aj  [address] [facility] [address]     Dear Colleague,    Thank you for referring your patient, Deepthi Aj, to the [facility] EAR NOSE AND THROAT CLINIC [place] at [facility]. Please see a copy of my visit note below.    PROCEDURE: Flexible fiberoptic transnasal laryngoscopy/bronchoscopy with steroid injection to subglottis  PREOPERATIVE DIAGNOSIS: Subglottic stenosis  POSTOPERATIVE DIAGNOSIS: Same  SURGEON: Kelsey Valadez MD   ASSISTANT: Mariella Roach RN  INDICATIONS: Deepthi Aj is a pleasant 32 year old female with subglottic stenosis who presented with persistent/ recurrent inflammation. We discussed options for intervention, and the patient opted for a steroid injection under local anesthesia after hearing about the risks, benefits, and alternatives. The injection was performed under fiberoptic visualization, which was necessary to confirm appropriate placement of the injectate.  FINDINGS: Mild to moderate subglottic stenosis with mild associated inflammation. Sticky yellow secretions, scattered. A total of ~0.8 cc of Kenalog-40 was injected into the subglottis. Patent to eva, mainstem bronchi unremarkable.  DESCRIPTION OF PROCEDURE: After written informed consent was obtained, a time-out was performed to confirm patient identity, procedure, and procedure site. Two-three atomizer puffs of topical 3% lidocaine with 0.25% phenylephrine was applied to the patient's oropharynx and nasal cavities bilaterally, followed by temporary placement of saturated cottonoids to further ensure topical anesthesia. The channeled transnasal flexible laryngoscope was then introduced. To achieve adequate laryngeal anesthesia, a total of  18 cc of 2% lidocaine was then applied using laryngeal gargle technique. The patient was instructed to cough and expectorate as much of the gargle solution as possible. The 25G  endoscopic needle was then introduced through the endoscope sheath. Kenalog-40 was injected as above. Once all areas of prominence had been injected, with a particular focus on the posterior right region(s), the procedure was completed. The airway remained patent.   COMPLICATIONS: None apparent.   DISPOSITION: Stable to home.   PLAN: Return in 3-4 weeks for checkup in clinic. Also will start ciprodex nebs. Patient reported today that she is not using any steroid nebs presently.      Again, thank you for allowing me to participate in the care of your patient.      Sincerely,    Kelsey Valadez MD

## 2023-01-19 NOTE — PROGRESS NOTES
PROCEDURE: Flexible fiberoptic transnasal laryngoscopy/bronchoscopy with steroid injection to subglottis  PREOPERATIVE DIAGNOSIS: Subglottic stenosis  POSTOPERATIVE DIAGNOSIS: Same  SURGEON: Kelsey Valadez MD   ASSISTANT: Mariella Roach RN  INDICATIONS: Deepthi Aj is a pleasant 32 year old female with subglottic stenosis who presented with persistent/ recurrent inflammation. We discussed options for intervention, and the patient opted for a steroid injection under local anesthesia after hearing about the risks, benefits, and alternatives. The injection was performed under fiberoptic visualization, which was necessary to confirm appropriate placement of the injectate.  FINDINGS: Mild to moderate subglottic stenosis with mild associated inflammation. Sticky yellow secretions, scattered. A total of ~0.8 cc of Kenalog-40 was injected into the subglottis. Patent to eva, mainstem bronchi unremarkable.  DESCRIPTION OF PROCEDURE: After written informed consent was obtained, a time-out was performed to confirm patient identity, procedure, and procedure site. Two-three atomizer puffs of topical 3% lidocaine with 0.25% phenylephrine was applied to the patient's oropharynx and nasal cavities bilaterally, followed by temporary placement of saturated cottonoids to further ensure topical anesthesia. The channeled transnasal flexible laryngoscope was then introduced. To achieve adequate laryngeal anesthesia, a total of  18 cc of 2% lidocaine was then applied using laryngeal gargle technique. The patient was instructed to cough and expectorate as much of the gargle solution as possible. The 25G endoscopic needle was then introduced through the endoscope sheath. Kenalog-40 was injected as above. Once all areas of prominence had been injected, with a particular focus on the posterior right region(s), the procedure was completed. The airway remained patent.   COMPLICATIONS: None apparent.   DISPOSITION: Stable to home.    PLAN: Return in 3-4 weeks for checkup in clinic. Also will start ciprodex nebs. Patient reported today that she is not using any steroid nebs presently.

## 2023-01-20 ENCOUNTER — TELEPHONE (OUTPATIENT)
Dept: OTOLARYNGOLOGY | Facility: CLINIC | Age: 33
End: 2023-01-20
Payer: COMMERCIAL

## 2023-01-20 NOTE — TELEPHONE ENCOUNTER
Spoke to pharmacy representative to verify prescription and route. Pharmacy tech verbalized understanding and appreciative of call.

## 2023-01-20 NOTE — TELEPHONE ENCOUNTER
[facility] Call Center    Phone Message    May a detailed message be left on voicemail: yes     Reason for Call: Other: kadeem from pharmacy calling asking if Ciprodex ear drops are suppose to be put in the nebulizer per prescription? [phone]     Action Taken: Other: routing to [facility] ent csc    Travel Screening: Not Applicable

## 2023-02-12 ENCOUNTER — HEALTH MAINTENANCE LETTER (OUTPATIENT)
Age: 33
End: 2023-02-12

## 2023-02-20 ENCOUNTER — OFFICE VISIT (OUTPATIENT)
Dept: OTOLARYNGOLOGY | Facility: CLINIC | Age: 33
End: 2023-02-20
Payer: COMMERCIAL

## 2023-02-20 VITALS — HEIGHT: 71 IN | WEIGHT: 293 LBS | BODY MASS INDEX: 41.02 KG/M2 | HEART RATE: 77 BPM

## 2023-02-20 DIAGNOSIS — M31.30 GRANULOMATOSIS WITH POLYANGIITIS WITHOUT RENAL INVOLVEMENT (H): ICD-10-CM

## 2023-02-20 DIAGNOSIS — R06.00 DYSPNEA, UNSPECIFIED TYPE: ICD-10-CM

## 2023-02-20 DIAGNOSIS — J38.6 SUBGLOTTIC STENOSIS: Primary | ICD-10-CM

## 2023-02-20 PROCEDURE — 31622 DX BRONCHOSCOPE/WASH: CPT | Performed by: OTOLARYNGOLOGY

## 2023-02-20 PROCEDURE — 99214 OFFICE O/P EST MOD 30 MIN: CPT | Mod: 25 | Performed by: OTOLARYNGOLOGY

## 2023-02-20 ASSESSMENT — PAIN SCALES - GENERAL: PAINLEVEL: MILD PAIN (3)

## 2023-02-20 NOTE — PROGRESS NOTES
"Chief Complaint   Patient presents with     RECHECK     Height 1.803 m (5' 11\"), weight (!) 211 kg (465 lb 3.2 oz).      Blood pressure was taken for this visit. 154/95, pt said the reading is wrong and doesn't want it reported in her chart.     Ronny Winter    "

## 2023-02-20 NOTE — PROGRESS NOTES
Dear Colleague:  Deepthi Aj recently returned for follow-up at the [facility]. My clinic note from our visit is enclosed below.  Speech recognition software may have been used in the documentation below; input is reviewed before signature to the best of my ability.     I appreciate the ongoing opportunity to participate in this patient's care.    Please feel free to contact me with any questions.      Sincerely yours,  Kelsey Valadez M.D., M.P.H.  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     Procedure history:  11/23/22: Microdirect laryngoscopy with CO2 laser incision, steroid injection, and dilation, for subglottic stenosis  1/19/23: Flexible fiberoptic laryngoscopy with steroid injection    Today's updates:  -Peak flows are around 450, immediately postop after MDL were 500  -Awake injection seemed to improve breathing also  -Breathing was feeling good until about a week ago, when it started to feel slightly tighter; mucus feels like it's sticking on a shelf  -Swallowing fine  -did not start nebulized ciprodex yet due to logistical issues, but plans to start soon.  -seeing rheumatologist again on Fri      MEDICATIONS:     Current Outpatient Medications   Medication Sig Dispense Refill     Avacopan 10 MG CAPS 1 capsule       budesonide (PULMICORT) 0.5 MG/2ML neb solution Take 2 mLs (0.5 mg) by nebulization as needed (as needed up to 4 times daily to help manage subglottic stenosis) 240 mL 4     Calcium Carb-Cholecalciferol (CALCIUM 1000 + D) 1000-20 MG-MCG TABS Take 1 tablet by mouth daily       cholecalciferol 125 MCG (5000 UT) CAPS Take 1 capsule by mouth 2 times daily       labetalol (NORMODYNE) 200 MG tablet Take 200 mg by mouth 2  times daily       naproxen (NAPROSYN) 500 MG tablet TAKE ONE TABLET BY MOUTH TWICE DAILY AS NEEDED       predniSONE (DELTASONE) 5 MG tablet Take 7.5 mg by mouth daily       riTUXimab (RITUXAN) 10 MG/ML injection        sodium chloride 0.9 % neb solution Take 5 mLs by nebulization as needed for other (as needed up to 4 times per day to manage subglottic stenosis) 600 mL 4     sulfamethoxazole-trimethoprim (BACTRIM) 400-80 MG tablet Take 1 tablet by mouth daily       vitamin C (ASCORBIC ACID) 500 MG tablet Take 500 mg by mouth daily         ALLERGIES:    Allergies   Allergen Reactions     Metformin Diarrhea and Nausea and Vomiting       NEW PMH/PSH: None    REVIEW OF SYSTEMS:  The patient completed a comprehensive 11 point review of systems (below), which was reviewed. Positives are as noted below.  Patient Supplied Answers to Review of Systems   ENT ROS 11/18/2022   Cardiopulmonary: Breathing problems, Wheezing       PHYSICAL EXAM:  General: The patient was alert and conversant, and in no acute distress. Patient accompanied by her spouse.  Oral cavity/oropharynx: No masses or lesions. Dentition unchanged since prior. Tongue mobility and palate elevation intact and symmetric.  Neck: No palpable cervical lymphadenopathy, no significant tenderness to palpation of the thyrohyoid space, which was narrow. No obvious thyroid abnormality.  Resp: Breathing comfortably, no stridor or stertor. Slightly audible with rapid inhalation.  Neuro: Symmetric facial function. Other cranial nerve function as documented above.  Psych: Normal affect, pleasant and cooperative.  Voice/speech: No significant dysphonia.      Procedure:   Flexible Bronchoscopy (17722)  Indications: This procedure was warranted to evaluate the patient's airway anatomy. Risks, benefits, and alternatives were discussed.  Description: After informed consent was obtained, a time-out was performed to confirm patient identity, procedure, and procedure site. Topical  3% lidocaine with 0.25% phenylephrine was applied to the nasal cavities and oropharynx. 3 ml of 2% lidocaine was delivered via catheter through the channel of the endoscope and the patient was instructed to cough and expectorate into the suction. I performed the endoscopy and no complications were apparent.  Performed by: Kelsey Valadez MD MPH  Findings: Glottis patent with good vocal fold mobility. Subglottis with mild to moderate stenosis; visibly tighter compared to most recent viewable exam in Dec 2022. Trachea clear. Kim sharp. Unremarkable takeoffs of mainstem bronchi.                                     IMPRESSION AND PLAN:   Deepthi Aj returns with some interval improvement of her breathing following steroid injection, then more recently, some worsening. She did have an interruption of medical management due to logistical challenges, but will resume this soon. She is also seeing her rheumatologist on Friday and we discussed that while her airway remains substantially better than at presentation, it does show some clear early recurrence of the stenosis. She may require some increased escalation of her rheumatologic management especially if her stenosis progresses further.    We discussed options including medical management, repeating steroid injection under local anesthesia, and returning to the OR under general anesthesia. Of these, she preferred to proceed with the first two. I will place a case request for an awake injection, ideally to be performed at the [facility].    Today's visit required additional screening time, PPE, and cleaning measures to allow for a safe in-person visit, due to the public health emergency. I spent a total of 34 minutes on 2/20/2023 in chart review, review of tests, patient visit, documentation, care coordination, and/or discussion with other providers about the issues documented above, separate from any documented procedure(s).

## 2023-02-20 NOTE — LETTER
"2/20/2023      RE: Deepthi Aj  [address] [facility] [address]       Chief Complaint   Patient presents with     RECHECK     Height 1.803 m (5' 11\"), weight (!) 211 kg (465 lb 3.2 oz).      Blood pressure was taken for this visit. 154/95, pt said the reading is wrong and doesn't want it reported in her chart.     Ronny Winter      Dear Colleague:  Deepthi Aj recently returned for follow-up at the [facility]. My clinic note from our visit is enclosed below.  Speech recognition software may have been used in the documentation below; input is reviewed before signature to the best of my ability.     I appreciate the ongoing opportunity to participate in this patient's care.    Please feel free to contact me with any questions.      Sincerely yours,  Kelsey Valadez M.D., M.P.H.  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     Procedure history:  11/23/22: Microdirect laryngoscopy with CO2 laser incision, steroid injection, and dilation, for subglottic stenosis  1/19/23: Flexible fiberoptic laryngoscopy with steroid injection    Today's updates:  -Peak flows are around 450, immediately postop after MDL were 500  -Awake injection seemed to improve breathing also  -Breathing was feeling good until about a week ago, when it started to feel slightly tighter; mucus feels like it's sticking on a shelf  -Swallowing fine  -did not start nebulized ciprodex yet due to logistical issues, but plans to start soon.  -seeing rheumatologist again on Fri      MEDICATIONS:     Current Outpatient Medications   Medication Sig Dispense Refill     Avacopan 10 MG CAPS 1 capsule       budesonide (PULMICORT) 0.5 MG/2ML neb solution Take 2 mLs (0.5 mg) by " nebulization as needed (as needed up to 4 times daily to help manage subglottic stenosis) 240 mL 4     Calcium Carb-Cholecalciferol (CALCIUM 1000 + D) 1000-20 MG-MCG TABS Take 1 tablet by mouth daily       cholecalciferol 125 MCG (5000 UT) CAPS Take 1 capsule by mouth 2 times daily       labetalol (NORMODYNE) 200 MG tablet Take 200 mg by mouth 2 times daily       naproxen (NAPROSYN) 500 MG tablet TAKE ONE TABLET BY MOUTH TWICE DAILY AS NEEDED       predniSONE (DELTASONE) 5 MG tablet Take 7.5 mg by mouth daily       riTUXimab (RITUXAN) 10 MG/ML injection        sodium chloride 0.9 % neb solution Take 5 mLs by nebulization as needed for other (as needed up to 4 times per day to manage subglottic stenosis) 600 mL 4     sulfamethoxazole-trimethoprim (BACTRIM) 400-80 MG tablet Take 1 tablet by mouth daily       vitamin C (ASCORBIC ACID) 500 MG tablet Take 500 mg by mouth daily         ALLERGIES:    Allergies   Allergen Reactions     Metformin Diarrhea and Nausea and Vomiting       NEW PMH/PSH: None    REVIEW OF SYSTEMS:  The patient completed a comprehensive 11 point review of systems (below), which was reviewed. Positives are as noted below.  Patient Supplied Answers to Review of Systems   ENT ROS 11/18/2022   Cardiopulmonary: Breathing problems, Wheezing       PHYSICAL EXAM:  General: The patient was alert and conversant, and in no acute distress. Patient accompanied by her spouse.  Oral cavity/oropharynx: No masses or lesions. Dentition unchanged since prior. Tongue mobility and palate elevation intact and symmetric.  Neck: No palpable cervical lymphadenopathy, no significant tenderness to palpation of the thyrohyoid space, which was narrow. No obvious thyroid abnormality.  Resp: Breathing comfortably, no stridor or stertor. Slightly audible with rapid inhalation.  Neuro: Symmetric facial function. Other cranial nerve function as documented above.  Psych: Normal affect, pleasant and cooperative.  Voice/speech: No  significant dysphonia.      Procedure:   Flexible Bronchoscopy (90652)  Indications: This procedure was warranted to evaluate the patient's airway anatomy. Risks, benefits, and alternatives were discussed.  Description: After informed consent was obtained, a time-out was performed to confirm patient identity, procedure, and procedure site. Topical 3% lidocaine with 0.25% phenylephrine was applied to the nasal cavities and oropharynx. 3 ml of 2% lidocaine was delivered via catheter through the channel of the endoscope and the patient was instructed to cough and expectorate into the suction. I performed the endoscopy and no complications were apparent.  Performed by: Kelsey Valadez MD MPH  Findings: Glottis patent with good vocal fold mobility. Subglottis with mild to moderate stenosis; visibly tighter compared to most recent viewable exam in Dec 2022. Trachea clear. Kim sharp. Unremarkable takeoffs of mainstem bronchi.                                     IMPRESSION AND PLAN:   Deepthi Aj returns with some interval improvement of her breathing following steroid injection, then more recently, some worsening. She did have an interruption of medical management due to logistical challenges, but will resume this soon. She is also seeing her rheumatologist on Friday and we discussed that while her airway remains substantially better than at presentation, it does show some clear early recurrence of the stenosis. She may require some increased escalation of her rheumatologic management especially if her stenosis progresses further.    We discussed options including medical management, repeating steroid injection under local anesthesia, and returning to the OR under general anesthesia. Of these, she preferred to proceed with the first two. I will place a case request for an awake injection, ideally to be performed at the [facility].    Today's visit required additional screening time, PPE, and cleaning measures to allow for  a safe in-person visit, due to the public health emergency. I spent a total of 34 minutes on 2/20/2023 in chart review, review of tests, patient visit, documentation, care coordination, and/or discussion with other providers about the issues documented above, separate from any documented procedure(s).        Kelsey Valadez MD

## 2023-02-20 NOTE — PATIENT INSTRUCTIONS
1.  You were seen in the ENT Clinic today by Dr. Valadez. If you have any questions or concerns after your appointment, please call [phone]. Press option #1 for scheduling related needs. Press option #3 for Nurse advice.    2.  Dr. Valadez has recommended the following:   - Procedure     3.  Plan is to return to clinic will plan follow up after prodcedure      Dana Barron RN  [phone]  [facility] - Otolaryngology

## 2023-02-20 NOTE — Clinical Note
"2/20/2023       RE: Deepthi Aj  [address] [facility] [address]     Dear Colleague,    Thank you for referring your patient, Deepthi Aj, to the [facility] [place] at [facility]. Please see a copy of my visit note below.    Chief Complaint   Patient presents with     RECHECK     Height 1.803 m (5' 11\"), weight (!) 211 kg (465 lb 3.2 oz).      Blood pressure was taken for this visit. 154/95, pt said the reading is wrong and doesn't want it reported in her chart.     Ronny Winter      Dear Colleague:  Deepthi Aj recently returned for follow-up at the [facility]. My clinic note from our visit is enclosed below.  Speech recognition software may have been used in the documentation below; input is reviewed before signature to the best of my ability.     I appreciate the ongoing opportunity to participate in this patient's care.    Please feel free to contact me with any questions.      Sincerely yours,  Kelsey Valadez M.D., M.P.H.  , Laryngology  Director, [facility]  Otolaryngology- Head & Neck Surgery  [phone]            =====  HISTORY OF PRESENT ILLNESS:  Deepthi Aj is a pleasant 32 year old female with *** who returns in follow up today. She reports that her symptoms are {VOICE SYMPTOMS:[id number]}. ***      Compared to 12 months ago, her voice problem is: {BETTER/WORSE/SAME:393857:o::1}.      Today's updates:  -Peak flows around 450, immediately postop were 500  -Awake injection seemed to improve breathing also  -Breathing was feeling good until about a week ago, when it started to feel slightly tighter; mucus feels like it's sticking on a shelf  -Swallowing fine  -did not start nebulizer, accidentally, but plans to start soon.  -seeing rheumatologist again on Fri      MEDICATIONS:     Current Outpatient Medications   Medication Sig " Dispense Refill     Avacopan 10 MG CAPS 1 capsule       budesonide (PULMICORT) 0.5 MG/2ML neb solution Take 2 mLs (0.5 mg) by nebulization as needed (as needed up to 4 times daily to help manage subglottic stenosis) 240 mL 4     Calcium Carb-Cholecalciferol (CALCIUM 1000 + D) 1000-20 MG-MCG TABS Take 1 tablet by mouth daily       cholecalciferol 125 MCG (5000 UT) CAPS Take 1 capsule by mouth 2 times daily       labetalol (NORMODYNE) 200 MG tablet Take 200 mg by mouth 2 times daily       naproxen (NAPROSYN) 500 MG tablet TAKE ONE TABLET BY MOUTH TWICE DAILY AS NEEDED       predniSONE (DELTASONE) 5 MG tablet Take 7.5 mg by mouth daily       riTUXimab (RITUXAN) 10 MG/ML injection        sodium chloride 0.9 % neb solution Take 5 mLs by nebulization as needed for other (as needed up to 4 times per day to manage subglottic stenosis) 600 mL 4     sulfamethoxazole-trimethoprim (BACTRIM) 400-80 MG tablet Take 1 tablet by mouth daily       vitamin C (ASCORBIC ACID) 500 MG tablet Take 500 mg by mouth daily         ALLERGIES:    Allergies   Allergen Reactions     Metformin Diarrhea and Nausea and Vomiting       NEW PMH/PSH: None    REVIEW OF SYSTEMS:  The patient completed a comprehensive 11 point review of systems (below), which was reviewed. Positives are as noted below.  Patient Supplied Answers to Review of Systems   ENT ROS 11/18/2022   Cardiopulmonary: Breathing problems, Wheezing       PHYSICAL EXAM:  General: The patient was alert and conversant, and in no acute distress. Patient accompanied by her spouse.  Oral cavity/oropharynx: No masses or lesions. Dentition unchanged since prior. Tongue mobility and palate elevation intact and symmetric.  Neck: No palpable cervical lymphadenopathy, no significant tenderness to palpation of the thyrohyoid space, which was narrow. No obvious thyroid abnormality.  Resp: Breathing comfortably, no stridor or stertor. Slightly audible with rapid inhalation.  Neuro: Symmetric facial  function. Other cranial nerve function as documented above.  Psych: Normal affect, pleasant and cooperative.  Voice/speech: No significant dysphonia.      Procedure:   Flexible Bronchoscopy (08307)  Indications: This procedure was warranted to evaluate the patient's airway anatomy. Risks, benefits, and alternatives were discussed.  Description: After informed consent was obtained, a time-out was performed to confirm patient identity, procedure, and procedure site. Topical 3% lidocaine with 0.25% phenylephrine was applied to the nasal cavities and oropharynx. *** ml of 2% lidocaine was delivered via catheter through the channel of the endoscope and the patient was instructed to cough and expectorate into the suction. I performed the endoscopy and no complications were apparent.  Performed by: Kelsey Valadez MD MPH  Findings: Glottis ***patent. Subglottis ***. Trachea ***. Kim sharp. Unremarkable takeoffs of mainstem bronchi.                                     IMPRESSION AND PLAN:   Deepthi SAFIA Jean Marie returns with ***    [_] case request for local     ***Today's visit required additional screening time, PPE, and cleaning measures to allow for a safe in-person visit, due to the public health emergency. I spent a total of *** minutes on ***2/20/2023 in chart review, review of tests, patient visit, documentation, care coordination, and/or discussion with other providers about the issues documented above, separate from any documented procedure(s).      Dear Colleague:  Deepthi Aj recently returned for follow-up at the [facility]. My clinic note from our visit is enclosed below.  Speech recognition software may have been used in the documentation below; input is reviewed before signature to the best of my ability.     I appreciate the ongoing opportunity to participate in this patient's care.    Please feel free to contact me with any questions.      Sincerely yours,  Kelsey Valadez M.D.,  CRUZ.  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     Procedure history:  11/23/22: Microdirect laryngoscopy with CO2 laser incision, steroid injection, and dilation, for subglottic stenosis  1/19/23: Flexible fiberoptic laryngoscopy with steroid injection    Today's updates:  -Peak flows are around 450, immediately postop after MDL were 500  -Awake injection seemed to improve breathing also  -Breathing was feeling good until about a week ago, when it started to feel slightly tighter; mucus feels like it's sticking on a shelf  -Swallowing fine  -did not start nebulized ciprodex yet due to logistical issues, but plans to start soon.  -seeing rheumatologist again on Fri      MEDICATIONS:     Current Outpatient Medications   Medication Sig Dispense Refill     Avacopan 10 MG CAPS 1 capsule       budesonide (PULMICORT) 0.5 MG/2ML neb solution Take 2 mLs (0.5 mg) by nebulization as needed (as needed up to 4 times daily to help manage subglottic stenosis) 240 mL 4     Calcium Carb-Cholecalciferol (CALCIUM 1000 + D) 1000-20 MG-MCG TABS Take 1 tablet by mouth daily       cholecalciferol 125 MCG (5000 UT) CAPS Take 1 capsule by mouth 2 times daily       labetalol (NORMODYNE) 200 MG tablet Take 200 mg by mouth 2 times daily       naproxen (NAPROSYN) 500 MG tablet TAKE ONE TABLET BY MOUTH TWICE DAILY AS NEEDED       predniSONE (DELTASONE) 5 MG tablet Take 7.5 mg by mouth daily       riTUXimab (RITUXAN) 10 MG/ML injection        sodium chloride 0.9 % neb solution Take 5 mLs by nebulization as needed for other (as needed up to 4 times per day to manage subglottic stenosis) 600 mL 4     sulfamethoxazole-trimethoprim (BACTRIM) 400-80 MG tablet Take 1 tablet by mouth  daily       vitamin C (ASCORBIC ACID) 500 MG tablet Take 500 mg by mouth daily         ALLERGIES:    Allergies   Allergen Reactions     Metformin Diarrhea and Nausea and Vomiting       NEW PMH/PSH: None    REVIEW OF SYSTEMS:  The patient completed a comprehensive 11 point review of systems (below), which was reviewed. Positives are as noted below.  Patient Supplied Answers to Review of Systems   ENT ROS 11/18/2022   Cardiopulmonary: Breathing problems, Wheezing       PHYSICAL EXAM:  General: The patient was alert and conversant, and in no acute distress. Patient accompanied by her spouse.  Oral cavity/oropharynx: No masses or lesions. Dentition unchanged since prior. Tongue mobility and palate elevation intact and symmetric.  Neck: No palpable cervical lymphadenopathy, no significant tenderness to palpation of the thyrohyoid space, which was narrow. No obvious thyroid abnormality.  Resp: Breathing comfortably, no stridor or stertor. Slightly audible with rapid inhalation.  Neuro: Symmetric facial function. Other cranial nerve function as documented above.  Psych: Normal affect, pleasant and cooperative.  Voice/speech: No significant dysphonia.      Procedure:   Flexible Bronchoscopy (24264)  Indications: This procedure was warranted to evaluate the patient's airway anatomy. Risks, benefits, and alternatives were discussed.  Description: After informed consent was obtained, a time-out was performed to confirm patient identity, procedure, and procedure site. Topical 3% lidocaine with 0.25% phenylephrine was applied to the nasal cavities and oropharynx. 3 ml of 2% lidocaine was delivered via catheter through the channel of the endoscope and the patient was instructed to cough and expectorate into the suction. I performed the endoscopy and no complications were apparent.  Performed by: Kelsey Valadez MD MPH  Findings: Glottis patent with good vocal fold mobility. Subglottis with mild to moderate stenosis; visibly  tighter compared to most recent viewable exam in Dec 2022. Trachea clear. Kim sharp. Unremarkable takeoffs of mainstem bronchi.                                     IMPRESSION AND PLAN:   Deepthi Aj returns with some interval improvement of her breathing following steroid injection, then more recently, some worsening. She did have an interruption of medical management due to logistical challenges, but will resume this soon. She is also seeing her rheumatologist on Friday and we discussed that while her airway remains substantially better than at presentation, it does show some clear early recurrence of the stenosis. She may require some increased escalation of her rheumatologic management especially if her stenosis progresses further.    We discussed options including medical management, repeating steroid injection under local anesthesia, and returning to the OR under general anesthesia. Of these, she preferred to proceed with the first two. I will place a case request for an awake injection, ideally to be performed at the ASC.    Today's visit required additional screening time, PPE, and cleaning measures to allow for a safe in-person visit, due to the public health emergency. I spent a total of 34 minutes on 2/20/2023 in chart review, review of tests, patient visit, documentation, care coordination, and/or discussion with other providers about the issues documented above, separate from any documented procedure(s).        Again, thank you for allowing me to participate in the care of your patient.      Sincerely,    Kelsey Valadez MD

## 2023-02-28 ENCOUNTER — TELEPHONE (OUTPATIENT)
Dept: OTOLARYNGOLOGY | Facility: CLINIC | Age: 33
End: 2023-02-28
Payer: COMMERCIAL

## 2023-02-28 NOTE — TELEPHONE ENCOUNTER
Left patient a voicemail to schedule Transnasal flexible laryngoscopy with steroid injection for subglottic stenosis, possible biopsy (N/A)  with Dr. Rory Cook on 2/28/2023 at 2:05 PM

## 2023-03-01 NOTE — TELEPHONE ENCOUNTER
Called patient to schedule surgery with Dr. Valadez    Date of Surgery: 3/9    Location of surgery: [facility]    Pre-Op H&P: NA local    Pre/Post Imaging:  Not Applicable    Discussed COVID-19 Testing: Yes home test    Post-Op Appt Date: 3-4 weeks    Surgery Packet Mailed: NA      Additional comments: FLACO Cook on 3/1/2023 at 3:29 PM

## 2023-03-02 ENCOUNTER — PATIENT OUTREACH (OUTPATIENT)
Dept: OTOLARYNGOLOGY | Facility: CLINIC | Age: 33
End: 2023-03-02
Payer: COMMERCIAL

## 2023-03-02 NOTE — PROGRESS NOTES
Talked with patient about moving appointment from 4/7 to 3/31 as she had conflicting appointments. Patient agreed to coming 3/31 at 7:30 am. Dana Barron RN on 3/2/2023 at 11:05 AM

## 2023-03-02 NOTE — PROGRESS NOTES
Called patient to try to schedule their post-op appointment with Dr. Valadez. No answer.   Dana Barron RN on 3/2/2023 at 9:56 AM

## 2023-03-06 NOTE — TELEPHONE ENCOUNTER
Patient has covid and rescheduled 3/9 surgery with Dr. Valadez for 4/13. She had no further questions    Dariana Cook, Surgery Scheduler 3/6/2023 at 9:55 AM

## 2023-04-13 ENCOUNTER — HOSPITAL ENCOUNTER (OUTPATIENT)
Facility: AMBULATORY SURGERY CENTER | Age: 33
Discharge: HOME | End: 2023-04-13
Attending: OTOLARYNGOLOGY | Admitting: OTOLARYNGOLOGY
Payer: COMMERCIAL

## 2023-04-13 VITALS
OXYGEN SATURATION: 97 % | HEART RATE: 72 BPM | BODY MASS INDEX: 41.02 KG/M2 | HEIGHT: 71 IN | DIASTOLIC BLOOD PRESSURE: 82 MMHG | TEMPERATURE: 97.1 F | WEIGHT: 293 LBS | RESPIRATION RATE: 14 BRPM | SYSTOLIC BLOOD PRESSURE: 149 MMHG

## 2023-04-13 DIAGNOSIS — R06.00 DYSPNEA, UNSPECIFIED TYPE: ICD-10-CM

## 2023-04-13 PROCEDURE — 31573 LARGSC W/THER INJECTION: CPT | Mod: 50 | Performed by: OTOLARYNGOLOGY

## 2023-04-13 RX ORDER — PHENTERMINE HYDROCHLORIDE 37.5 MG/1
37.5 CAPSULE ORAL
COMMUNITY
Start: 2023-01-28

## 2023-04-13 RX ORDER — LIDOCAINE HYDROCHLORIDE 20 MG/ML
INJECTION, SOLUTION INFILTRATION; PERINEURAL PRN
Status: DISCONTINUED | OUTPATIENT
Start: 2023-04-13 | End: 2023-04-13 | Stop reason: HOSPADM

## 2023-04-13 RX ORDER — TRIAMCINOLONE ACETONIDE 40 MG/ML
INJECTION, SUSPENSION INTRA-ARTICULAR; INTRAMUSCULAR PRN
Status: DISCONTINUED | OUTPATIENT
Start: 2023-04-13 | End: 2023-04-13 | Stop reason: HOSPADM

## 2023-04-13 RX ORDER — AZATHIOPRINE 50 MG/1
50 TABLET ORAL DAILY
COMMUNITY

## 2023-04-13 NOTE — H&P
Abbreviated H&P for ASC Procedure under Local Anesthesia    1. Chief complaint and/or reason for procedure  Subglottic stenosis  Granulomatosis with polyangiitis  Plan repeat steroid injection under local anesthesia.    2. Medical history describing significant medical conditions and previous surgeries  PAST MEDICAL HISTORY:   Past Medical History:   Diagnosis Date     Difficult intubation      Granulomatosis with polyangiitis (H)      Subglottic stenosis      Uncomplicated asthma         PAST SURGICAL HISTORY:   Past Surgical History:   Procedure Laterality Date     INJECT STEROID (LOCATION) N/A 11/23/2022    Procedure: steroid injection;  Surgeon: Kelsey Valadez MD;  Location: [facility] OR     LASER CO2 LARYNGOSCOPY N/A 11/23/2022    Procedure: Microdirect laryngoscopy with CO2 Laser incision, dilation, for subglottic stenosis;  Surgeon: Kelsey Valadez MD;  Location: [facility] OR       Health history changes since last seen? Had COVID, which led to delaying this procedure. Took and completed Paxlovid. Then restarted Imuran. Next rituxan dose coming up in a few weeks.      3. Current medications and allergies  MEDICATIONS:     Current Outpatient Medications   Medication Sig Dispense Refill     Avacopan 10 MG CAPS 1 capsule       azaTHIOprine (IMURAN) 50 MG tablet Take 50 mg by mouth daily       budesonide (PULMICORT) 0.5 MG/2ML neb solution Take 2 mLs (0.5 mg) by nebulization as needed (as needed up to 4 times daily to help manage subglottic stenosis) 240 mL 4     Calcium Carb-Cholecalciferol (CALCIUM 1000 + D) 1000-20 MG-MCG TABS Take 1 tablet by mouth daily       cholecalciferol 125 MCG (5000 UT) CAPS Take 1 capsule by mouth 2 times daily       labetalol (NORMODYNE) 200 MG tablet Take 200 mg by mouth 2 times daily       phentermine (ADIPEX-P) 37.5 MG capsule Take 37.5 mg by mouth       predniSONE (DELTASONE) 5 MG tablet Take 7.5 mg by mouth daily       sodium chloride 0.9 % neb solution Take 5 mLs by  nebulization as needed for other (as needed up to 4 times per day to manage subglottic stenosis) 600 mL 4     sulfamethoxazole-trimethoprim (BACTRIM) 400-80 MG tablet Take 1 tablet by mouth daily       vitamin C (ASCORBIC ACID) 500 MG tablet Take 500 mg by mouth daily       naproxen (NAPROSYN) 500 MG tablet TAKE ONE TABLET BY MOUTH TWICE DAILY AS NEEDED       riTUXimab (RITUXAN) 10 MG/ML injection          ALLERGIES:    Allergies   Allergen Reactions     Metformin Diarrhea and Nausea and Vomiting       4. Review of systems  As above.      5. Physical examination  Vital signs stable.  Awake, alert, conversant.  Breathing comfortably, no stridor/stertor.   Voice with mild breathiness.    6. ASA classification  ASA III    Plan to proceed as scheduled; will assess airway and inject as appropriate.

## 2023-04-13 NOTE — OP NOTE
PROCEDURE: Flexible fiberoptic transnasal laryngoscopy/bronchoscopy with steroid injection to subglottis  PREOPERATIVE DIAGNOSIS: Subglottic stenosis  POSTOPERATIVE DIAGNOSIS: Same.   SURGEON: Kelsey Valadez MD.   INDICATIONS: Deepthi Aj is a pleasant 32 year old female with recurrent subglottic stenosis and GPA. We discussed options for intervention, and the patient opted for a steroid injection under local anesthesia after hearing about the risks, benefits, and alternatives. The injection was performed under fiberoptic visualization, which was necessary to confirm appropriate placement of the injectate.  FINDINGS: Mild subglottic stenosis with associated inflammation. A total of ~0.7 cc of Kenalog-40 was injected into the subglottis. Patent to eva, mainstem bronchi unremarkable.  DESCRIPTION OF PROCEDURE: After written informed consent was obtained, a time-out was performed to confirm patient identity, procedure, and procedure site. Two-three atomizer puffs of topical 3% lidocaine with 0.25% phenylephrine was applied to the patient's oropharynx and nasal cavities bilaterally, followed by temporary placement of saturated cottonoids to further ensure topical anesthesia. The transnasal flexible laryngoscope was then introduced. To achieve adequate laryngeal anesthesia, a total of 18 cc of 2% lidocaine was then applied using laryngeal gargle technique. The patient was instructed to cough and expectorate as much of the gargle solution as possible. The 25G endoscopic needle was then introduced through the endoscope sheath. Kenalog-40 was injected as above. Once all areas of prominence had been injected, with a particular focus on the posterior/left region(s), the procedure was completed. The airway remained patent.   COMPLICATIONS: None apparent.   DISPOSITION: Stable to home.   PLAN: Return to clinic in ~3-4 weeks or sooner as needed.

## 2023-05-08 ENCOUNTER — OFFICE VISIT (OUTPATIENT)
Dept: OTOLARYNGOLOGY | Facility: CLINIC | Age: 33
End: 2023-05-08
Payer: COMMERCIAL

## 2023-05-08 VITALS — BODY MASS INDEX: 41.02 KG/M2 | HEIGHT: 71 IN | WEIGHT: 293 LBS

## 2023-05-08 DIAGNOSIS — E66.01 MORBID OBESITY (H): ICD-10-CM

## 2023-05-08 DIAGNOSIS — J38.6 SUBGLOTTIC STENOSIS: Primary | ICD-10-CM

## 2023-05-08 DIAGNOSIS — M31.30 GRANULOMATOSIS WITH POLYANGIITIS WITHOUT RENAL INVOLVEMENT (H): ICD-10-CM

## 2023-05-08 PROCEDURE — 31622 DX BRONCHOSCOPE/WASH: CPT | Performed by: OTOLARYNGOLOGY

## 2023-05-08 PROCEDURE — 99213 OFFICE O/P EST LOW 20 MIN: CPT | Mod: 25 | Performed by: OTOLARYNGOLOGY

## 2023-05-08 ASSESSMENT — PAIN SCALES - GENERAL: PAINLEVEL: NO PAIN (0)

## 2023-05-08 NOTE — PROGRESS NOTES
Dear Colleague:    Deepthi Aj recently returned for follow-up at the [facility]. My clinic note from our visit is enclosed below.  Speech recognition software may have been used in the documentation below; input is reviewed before signature to the best of my ability.     I appreciate the ongoing opportunity to participate in this patient's care.    Please feel free to contact me with any questions.    Sincerely yours,        Kelsey Valadez M.D., M.P.H.  , Laryngology  Director, [facility]  Otolaryngology- Head & Neck Surgery  [phone]            =====  HISTORY OF PRESENT ILLNESS:  Deepthi Aj is a pleasant 32-year-old female  subglottic stenosis secondary to GPA who returns in follow up today.     Procedure history:  11/23/22: Microdirect laryngoscopy with CO2 laser incision, steroid injection, and dilation, for subglottic stenosis  1/19/23, 4/13/23: Flexible fiberoptic laryngoscopy with steroid injection      Today's updates:  - Imuran and rituxan seem to be helping.  - Steroid injection did not seem to make a big difference.  - Peak flow was about 400 as of a few weeks ago.  - She does feel like her breathing is a little limited but not worse than a month ago.      MEDICATIONS:     Current Outpatient Medications   Medication Sig Dispense Refill     Avacopan 10 MG CAPS 1 capsule       azaTHIOprine (IMURAN) 50 MG tablet Take 50 mg by mouth daily       budesonide (PULMICORT) 0.5 MG/2ML neb solution Take 2 mLs (0.5 mg) by nebulization as needed (as needed up to 4 times daily to help manage subglottic stenosis) 240 mL 4     Calcium Carb-Cholecalciferol (CALCIUM 1000 + D) 1000-20 MG-MCG TABS Take 1 tablet by mouth daily       cholecalciferol 125 MCG (5000 UT) CAPS Take 1 capsule by mouth 2 times daily       labetalol (NORMODYNE) 200 MG tablet Take 200 mg by mouth 2 times daily       naproxen (NAPROSYN) 500 MG tablet TAKE ONE TABLET BY MOUTH TWICE  DAILY AS NEEDED       phentermine (ADIPEX-P) 37.5 MG capsule Take 37.5 mg by mouth       predniSONE (DELTASONE) 5 MG tablet Take 7.5 mg by mouth daily       riTUXimab (RITUXAN) 10 MG/ML injection        sodium chloride 0.9 % neb solution Take 5 mLs by nebulization as needed for other (as needed up to 4 times per day to manage subglottic stenosis) 600 mL 4     sulfamethoxazole-trimethoprim (BACTRIM) 400-80 MG tablet Take 1 tablet by mouth daily       vitamin C (ASCORBIC ACID) 500 MG tablet Take 500 mg by mouth daily         ALLERGIES:    Allergies   Allergen Reactions     Metformin Diarrhea and Nausea and Vomiting       NEW PMH/PSH: None    REVIEW OF SYSTEMS:  The patient completed a comprehensive 11 point review of systems (below), which was reviewed. Positives are as noted below.  Patient Supplied Answers to Review of Systems      11/18/2022     6:46 PM   [facility] ROS   Cardiopulmonary Breathing problems    Wheezing         PHYSICAL EXAM:  General: The patient was alert and conversant, and in no acute distress.    Oral cavity/oropharynx: No masses or lesions. Dentition unchanged since prior. Tongue mobility and palate elevation intact and symmetric.  Resp: Breathing comfortably, no stridor or stertor. Mildly audible with rapid inhale.  Neuro: Symmetric facial function. Other cranial nerve function as documented above.  Psych: Normal affect, pleasant and cooperative.  Voice/speech: No significant dysphonia.         Last 2 Scores for Patient-Answered CSI Questionnaire      12/16/2022    12:07 PM 5/2/2023     7:08 PM   CSI Total Score   CSI Total Score 0 19           Procedure:   Flexible Bronchoscopy (88785)  Indications: This procedure was warranted to evaluate the patient's airway anatomy. Risks, benefits, and alternatives were discussed.  Description: After informed consent was obtained, a time-out was performed to confirm patient identity, procedure, and procedure site. Topical 3% lidocaine with 0.25% phenylephrine  was applied to the nasal cavities and oropharynx. 2 ml of 2% lidocaine was delivered via catheter through the channel of the endoscope and the patient was instructed to cough and expectorate into the suction. I performed the endoscopy and no complications were apparent.  Performed by: Kelsey Valadez MD MPH  Findings: Glottis patent with good bilateral vocal fold mobility. Subglottis with moderate stenosis, no acute inflammation. Trachea clear. Kim sharp. Unremarkable takeoffs of mainstem bronchi.                              IMPRESSION AND PLAN:   Deepthi Aj returns with a stable airway. She feels her GPA is under gradually better control. She does feel her airway is limited and would like to plan towards a repeat airway surgery this summer. The hope is that she might get more longevity this time since her GPA seems more quiescent lately.    We will tentatively plan for repeat MicroDirect laryngoscopy in June 2023. She is aware of potential surgical risks, having completed the surgery within the past year. A case request was placed. She will let me know if any questions come up in the meantime.    I appreciate the opportunity to participate in the care of this patient.     I spent a total of 22 minutes on 5/8/2023 in chart review, review of tests, patient visit, documentation, care coordination, and/or discussion with other providers about the issues documented above, separate from any documented procedure(s).    Warm intro completed for airway study. Prefers email or MyChart contact over phone if possible.

## 2023-05-08 NOTE — LETTER
5/8/2023      RE: Deepthi Aj  [address] [facility] [address]       Dear Colleague:    Deepthi Aj recently returned for follow-up at the [facility]. My clinic note from our visit is enclosed below.  Speech recognition software may have been used in the documentation below; input is reviewed before signature to the best of my ability.     I appreciate the ongoing opportunity to participate in this patient's care.    Please feel free to contact me with any questions.    Sincerely yours,        Kelsey Valadez M.D., M.P.H.  , Laryngology  Director, [facility]  Otolaryngology- Head & Neck Surgery  [phone]            =====  HISTORY OF PRESENT ILLNESS:  Deepthi Aj is a pleasant 32-year-old female  subglottic stenosis secondary to GPA who returns in follow up today.     Procedure history:  11/23/22: Microdirect laryngoscopy with CO2 laser incision, steroid injection, and dilation, for subglottic stenosis  1/19/23, 4/13/23: Flexible fiberoptic laryngoscopy with steroid injection      Today's updates:  - Imuran and rituxan seem to be helping.  - Steroid injection did not seem to make a big difference.  - Peak flow was about 400 as of a few weeks ago.  - She does feel like her breathing is a little limited but not worse than a month ago.      MEDICATIONS:     Current Outpatient Medications   Medication Sig Dispense Refill    Avacopan 10 MG CAPS 1 capsule      azaTHIOprine (IMURAN) 50 MG tablet Take 50 mg by mouth daily      budesonide (PULMICORT) 0.5 MG/2ML neb solution Take 2 mLs (0.5 mg) by nebulization as needed (as needed up to 4 times daily to help manage subglottic stenosis) 240 mL 4    Calcium Carb-Cholecalciferol (CALCIUM 1000 + D) 1000-20 MG-MCG TABS Take 1 tablet by mouth daily      cholecalciferol 125 MCG (5000 UT) CAPS Take 1 capsule by mouth 2 times daily      labetalol (NORMODYNE) 200 MG tablet Take 200 mg by mouth 2  times daily      naproxen (NAPROSYN) 500 MG tablet TAKE ONE TABLET BY MOUTH TWICE DAILY AS NEEDED      phentermine (ADIPEX-P) 37.5 MG capsule Take 37.5 mg by mouth      predniSONE (DELTASONE) 5 MG tablet Take 7.5 mg by mouth daily      riTUXimab (RITUXAN) 10 MG/ML injection       sodium chloride 0.9 % neb solution Take 5 mLs by nebulization as needed for other (as needed up to 4 times per day to manage subglottic stenosis) 600 mL 4    sulfamethoxazole-trimethoprim (BACTRIM) 400-80 MG tablet Take 1 tablet by mouth daily      vitamin C (ASCORBIC ACID) 500 MG tablet Take 500 mg by mouth daily         ALLERGIES:    Allergies   Allergen Reactions    Metformin Diarrhea and Nausea and Vomiting       NEW PMH/PSH: None    REVIEW OF SYSTEMS:  The patient completed a comprehensive 11 point review of systems (below), which was reviewed. Positives are as noted below.  Patient Supplied Answers to Review of Systems      11/18/2022     6:46 PM   [facility] ENT ROS   Cardiopulmonary Breathing problems    Wheezing         PHYSICAL EXAM:  General: The patient was alert and conversant, and in no acute distress.    Oral cavity/oropharynx: No masses or lesions. Dentition unchanged since prior. Tongue mobility and palate elevation intact and symmetric.  Resp: Breathing comfortably, no stridor or stertor. Mildly audible with rapid inhale.  Neuro: Symmetric facial function. Other cranial nerve function as documented above.  Psych: Normal affect, pleasant and cooperative.  Voice/speech: No significant dysphonia.         Last 2 Scores for Patient-Answered CSI Questionnaire      12/16/2022    12:07 PM 5/2/2023     7:08 PM   CSI Total Score   CSI Total Score 0 19           Procedure:   Flexible Bronchoscopy (25068)  Indications: This procedure was warranted to evaluate the patient's airway anatomy. Risks, benefits, and alternatives were discussed.  Description: After informed consent was obtained, a time-out was performed to confirm patient identity,  procedure, and procedure site. Topical 3% lidocaine with 0.25% phenylephrine was applied to the nasal cavities and oropharynx. 2 ml of 2% lidocaine was delivered via catheter through the channel of the endoscope and the patient was instructed to cough and expectorate into the suction. I performed the endoscopy and no complications were apparent.  Performed by: Kelsey Valadez MD MPH  Findings: Glottis patent with good bilateral vocal fold mobility. Subglottis with moderate stenosis, no acute inflammation. Trachea clear. Kim sharp. Unremarkable takeoffs of mainstem bronchi.                              IMPRESSION AND PLAN:   Deepthi Aj returns with a stable airway. She feels her GPA is under gradually better control. She does feel her airway is limited and would like to plan towards a repeat airway surgery this summer. The hope is that she might get more longevity this time since her GPA seems more quiescent lately.    We will tentatively plan for repeat MicroDirect laryngoscopy in June 2023. She is aware of potential surgical risks, having completed the surgery within the past year. A case request was placed. She will let me know if any questions come up in the meantime.    I appreciate the opportunity to participate in the care of this patient.     I spent a total of 22 minutes on 5/8/2023 in chart review, review of tests, patient visit, documentation, care coordination, and/or discussion with other providers about the issues documented above, separate from any documented procedure(s).    Warm intro completed for airway study. Prefers email or MyChart contact over phone if possible.    Kelsey Valadez MD

## 2023-05-08 NOTE — NURSING NOTE
"Chief Complaint   Patient presents with     RECHECK     3-4 week post op       Height 1.803 m (5' 11\"), weight (!) 206.4 kg (455 lb).    Ebony Cole, EMT    "

## 2023-05-08 NOTE — PATIENT INSTRUCTIONS
1.  You were seen in the ENT Clinic today by Dr. Valadez. If you have any questions or concerns after your appointment, please call [phone]. Press option #1 for scheduling related needs. Press option #3 for Nurse advice.    2.  Plan is to return to clinic June 2023      Dana Barron RN  [phone]  [facility] - Otolaryngology

## 2023-05-16 ENCOUNTER — TELEPHONE (OUTPATIENT)
Dept: OTOLARYNGOLOGY | Facility: CLINIC | Age: 33
End: 2023-05-16
Payer: COMMERCIAL

## 2023-05-16 NOTE — TELEPHONE ENCOUNTER
Left patient a voicemail to schedule Microdirect laryngoscopy with incision, steroid injection, possible dilation, possible CO2 laser, for subglottic stenosis (N/A)  with Dr. Rory Cook on 5/16/2023 at 2:57 PM

## 2023-05-16 NOTE — TELEPHONE ENCOUNTER
Patient called to schedule surgery with Dr. Valadez    Date of Surgery: 5/25    Location of surgery: [facility]    Pre-Op H&P: PCP on file    Pre/Post Imaging:  Not Applicable    Post-Op Appt Date: 3 weeks    Surgery Packet Mailed: demiant per patients preference    Additional comments: FLACO Cook on 5/16/2023 at 3:42 PM

## 2023-05-23 RX ORDER — ALBUTEROL SULFATE 90 UG/1
1-2 AEROSOL, METERED RESPIRATORY (INHALATION)
COMMUNITY
Start: 2021-09-10 | End: 2024-01-22

## 2023-05-25 ENCOUNTER — HOSPITAL ENCOUNTER (OUTPATIENT)
Facility: CLINIC | Age: 33
Discharge: HOME | End: 2023-05-25
Attending: OTOLARYNGOLOGY | Admitting: OTOLARYNGOLOGY
Payer: COMMERCIAL

## 2023-05-25 ENCOUNTER — ANESTHESIA (OUTPATIENT)
Dept: SURGERY | Facility: CLINIC | Age: 33
End: 2023-05-25
Payer: COMMERCIAL

## 2023-05-25 ENCOUNTER — ANESTHESIA EVENT (OUTPATIENT)
Dept: SURGERY | Facility: CLINIC | Age: 33
End: 2023-05-25
Payer: COMMERCIAL

## 2023-05-25 VITALS
OXYGEN SATURATION: 99 % | RESPIRATION RATE: 18 BRPM | SYSTOLIC BLOOD PRESSURE: 142 MMHG | TEMPERATURE: 97.8 F | WEIGHT: 293 LBS | BODY MASS INDEX: 41.02 KG/M2 | HEIGHT: 71 IN | HEART RATE: 74 BPM | DIASTOLIC BLOOD PRESSURE: 82 MMHG

## 2023-05-25 DIAGNOSIS — J38.6 SUBGLOTTIC STENOSIS: Primary | ICD-10-CM

## 2023-05-25 PROCEDURE — C1726 CATH, BAL DIL, NON-VASCULAR: HCPCS | Performed by: OTOLARYNGOLOGY

## 2023-05-25 PROCEDURE — 710N000012 HC RECOVERY PHASE 2, PER MINUTE: Performed by: OTOLARYNGOLOGY

## 2023-05-25 PROCEDURE — 710N000010 HC RECOVERY PHASE 1, LEVEL 2, PER MIN: Performed by: OTOLARYNGOLOGY

## 2023-05-25 PROCEDURE — 250N000009 HC RX 250: Performed by: NURSE ANESTHETIST, CERTIFIED REGISTERED

## 2023-05-25 PROCEDURE — 999N000127 HC STATISTIC PERIPHERAL IV START W US GUIDANCE

## 2023-05-25 PROCEDURE — 360N000077 HC SURGERY LEVEL 4, PER MIN: Performed by: OTOLARYNGOLOGY

## 2023-05-25 PROCEDURE — 250N000011 HC RX IP 250 OP 636: Performed by: OTOLARYNGOLOGY

## 2023-05-25 PROCEDURE — 999N000141 HC STATISTIC PRE-PROCEDURE NURSING ASSESSMENT: Performed by: OTOLARYNGOLOGY

## 2023-05-25 PROCEDURE — 272N000001 HC OR GENERAL SUPPLY STERILE: Performed by: OTOLARYNGOLOGY

## 2023-05-25 PROCEDURE — 31529 LARYNGOSCOPY AND DILATION: CPT | Mod: 51 | Performed by: OTOLARYNGOLOGY

## 2023-05-25 PROCEDURE — 258N000003 HC RX IP 258 OP 636: Performed by: NURSE ANESTHETIST, CERTIFIED REGISTERED

## 2023-05-25 PROCEDURE — 370N000017 HC ANESTHESIA TECHNICAL FEE, PER MIN: Performed by: OTOLARYNGOLOGY

## 2023-05-25 PROCEDURE — 250N000011 HC RX IP 250 OP 636: Performed by: NURSE ANESTHETIST, CERTIFIED REGISTERED

## 2023-05-25 PROCEDURE — 31571 LARYNGOSCOP W/VC INJ + SCOPE: CPT | Mod: 51 | Performed by: OTOLARYNGOLOGY

## 2023-05-25 PROCEDURE — 250N000013 HC RX MED GY IP 250 OP 250 PS 637: Performed by: NURSE ANESTHETIST, CERTIFIED REGISTERED

## 2023-05-25 PROCEDURE — 250N000009 HC RX 250: Performed by: OTOLARYNGOLOGY

## 2023-05-25 PROCEDURE — 31541 LARYNSCOP W/TUMR EXC + SCOPE: CPT | Mod: GC | Performed by: OTOLARYNGOLOGY

## 2023-05-25 PROCEDURE — 250N000025 HC SEVOFLURANE, PER MIN: Performed by: OTOLARYNGOLOGY

## 2023-05-25 RX ORDER — ONDANSETRON 4 MG/1
4 TABLET, ORALLY DISINTEGRATING ORAL EVERY 30 MIN PRN
Status: DISCONTINUED | OUTPATIENT
Start: 2023-05-25 | End: 2023-05-25 | Stop reason: HOSPADM

## 2023-05-25 RX ORDER — OXYCODONE HYDROCHLORIDE 5 MG/1
5 TABLET ORAL
Status: DISCONTINUED | OUTPATIENT
Start: 2023-05-25 | End: 2023-05-25 | Stop reason: HOSPADM

## 2023-05-25 RX ORDER — HYDROMORPHONE HCL IN WATER/PF 6 MG/30 ML
0.2 PATIENT CONTROLLED ANALGESIA SYRINGE INTRAVENOUS EVERY 5 MIN PRN
Status: DISCONTINUED | OUTPATIENT
Start: 2023-05-25 | End: 2023-05-25 | Stop reason: HOSPADM

## 2023-05-25 RX ORDER — DEXAMETHASONE SODIUM PHOSPHATE 4 MG/ML
INJECTION, SOLUTION INTRA-ARTICULAR; INTRALESIONAL; INTRAMUSCULAR; INTRAVENOUS; SOFT TISSUE PRN
Status: DISCONTINUED | OUTPATIENT
Start: 2023-05-25 | End: 2023-05-25

## 2023-05-25 RX ORDER — OXYCODONE HYDROCHLORIDE 10 MG/1
10 TABLET ORAL
Status: DISCONTINUED | OUTPATIENT
Start: 2023-05-25 | End: 2023-05-25 | Stop reason: HOSPADM

## 2023-05-25 RX ORDER — EPINEPHRINE 0.1 MG/ML
INJECTION INTRAVENOUS PRN
Status: DISCONTINUED | OUTPATIENT
Start: 2023-05-25 | End: 2023-05-25 | Stop reason: HOSPADM

## 2023-05-25 RX ORDER — LIDOCAINE HYDROCHLORIDE 40 MG/ML
SOLUTION TOPICAL PRN
Status: DISCONTINUED | OUTPATIENT
Start: 2023-05-25 | End: 2023-05-25 | Stop reason: HOSPADM

## 2023-05-25 RX ORDER — LIDOCAINE HYDROCHLORIDE 20 MG/ML
INJECTION, SOLUTION INFILTRATION; PERINEURAL PRN
Status: DISCONTINUED | OUTPATIENT
Start: 2023-05-25 | End: 2023-05-25

## 2023-05-25 RX ORDER — FENTANYL CITRATE 50 UG/ML
50 INJECTION, SOLUTION INTRAMUSCULAR; INTRAVENOUS EVERY 5 MIN PRN
Status: DISCONTINUED | OUTPATIENT
Start: 2023-05-25 | End: 2023-05-25 | Stop reason: HOSPADM

## 2023-05-25 RX ORDER — TRIAMCINOLONE ACETONIDE 40 MG/ML
INJECTION, SUSPENSION INTRA-ARTICULAR; INTRAMUSCULAR PRN
Status: DISCONTINUED | OUTPATIENT
Start: 2023-05-25 | End: 2023-05-25 | Stop reason: HOSPADM

## 2023-05-25 RX ORDER — PROPOFOL 10 MG/ML
INJECTION, EMULSION INTRAVENOUS PRN
Status: DISCONTINUED | OUTPATIENT
Start: 2023-05-25 | End: 2023-05-25

## 2023-05-25 RX ORDER — DEXAMETHASONE SODIUM PHOSPHATE 10 MG/ML
10 INJECTION, SOLUTION INTRAMUSCULAR; INTRAVENOUS ONCE
Status: DISCONTINUED | OUTPATIENT
Start: 2023-05-25 | End: 2023-05-25 | Stop reason: HOSPADM

## 2023-05-25 RX ORDER — AMPICILLIN AND SULBACTAM 1; .5 G/1; G/1
1.5 INJECTION, POWDER, FOR SOLUTION INTRAMUSCULAR; INTRAVENOUS SEE ADMIN INSTRUCTIONS
Status: DISCONTINUED | OUTPATIENT
Start: 2023-05-25 | End: 2023-05-25 | Stop reason: HOSPADM

## 2023-05-25 RX ORDER — AMPICILLIN AND SULBACTAM 2; 1 G/1; G/1
3 INJECTION, POWDER, FOR SOLUTION INTRAMUSCULAR; INTRAVENOUS
Status: COMPLETED | OUTPATIENT
Start: 2023-05-25 | End: 2023-05-25

## 2023-05-25 RX ORDER — ACETAMINOPHEN 325 MG/1
325-650 TABLET ORAL EVERY 6 HOURS PRN
Qty: 30 TABLET | Refills: 0 | Status: SHIPPED | OUTPATIENT
Start: 2023-05-25 | End: 2023-06-26

## 2023-05-25 RX ORDER — FENTANYL CITRATE 50 UG/ML
INJECTION, SOLUTION INTRAMUSCULAR; INTRAVENOUS PRN
Status: DISCONTINUED | OUTPATIENT
Start: 2023-05-25 | End: 2023-05-25

## 2023-05-25 RX ORDER — FENTANYL CITRATE 50 UG/ML
25 INJECTION, SOLUTION INTRAMUSCULAR; INTRAVENOUS EVERY 5 MIN PRN
Status: DISCONTINUED | OUTPATIENT
Start: 2023-05-25 | End: 2023-05-25 | Stop reason: HOSPADM

## 2023-05-25 RX ORDER — LIDOCAINE 40 MG/G
CREAM TOPICAL
Status: DISCONTINUED | OUTPATIENT
Start: 2023-05-25 | End: 2023-05-25 | Stop reason: HOSPADM

## 2023-05-25 RX ORDER — HYDROMORPHONE HCL IN WATER/PF 6 MG/30 ML
0.4 PATIENT CONTROLLED ANALGESIA SYRINGE INTRAVENOUS EVERY 5 MIN PRN
Status: DISCONTINUED | OUTPATIENT
Start: 2023-05-25 | End: 2023-05-25 | Stop reason: HOSPADM

## 2023-05-25 RX ORDER — ALBUTEROL SULFATE 90 UG/1
AEROSOL, METERED RESPIRATORY (INHALATION) PRN
Status: DISCONTINUED | OUTPATIENT
Start: 2023-05-25 | End: 2023-05-25

## 2023-05-25 RX ORDER — SODIUM CHLORIDE, SODIUM LACTATE, POTASSIUM CHLORIDE, CALCIUM CHLORIDE 600; 310; 30; 20 MG/100ML; MG/100ML; MG/100ML; MG/100ML
INJECTION, SOLUTION INTRAVENOUS CONTINUOUS
Status: DISCONTINUED | OUTPATIENT
Start: 2023-05-25 | End: 2023-05-25 | Stop reason: HOSPADM

## 2023-05-25 RX ORDER — SODIUM CHLORIDE, SODIUM LACTATE, POTASSIUM CHLORIDE, CALCIUM CHLORIDE 600; 310; 30; 20 MG/100ML; MG/100ML; MG/100ML; MG/100ML
INJECTION, SOLUTION INTRAVENOUS CONTINUOUS PRN
Status: DISCONTINUED | OUTPATIENT
Start: 2023-05-25 | End: 2023-05-25

## 2023-05-25 RX ORDER — ONDANSETRON 2 MG/ML
4 INJECTION INTRAMUSCULAR; INTRAVENOUS EVERY 30 MIN PRN
Status: DISCONTINUED | OUTPATIENT
Start: 2023-05-25 | End: 2023-05-25 | Stop reason: HOSPADM

## 2023-05-25 RX ORDER — PROPOFOL 10 MG/ML
INJECTION, EMULSION INTRAVENOUS CONTINUOUS PRN
Status: DISCONTINUED | OUTPATIENT
Start: 2023-05-25 | End: 2023-05-25

## 2023-05-25 RX ORDER — ONDANSETRON 2 MG/ML
INJECTION INTRAMUSCULAR; INTRAVENOUS PRN
Status: DISCONTINUED | OUTPATIENT
Start: 2023-05-25 | End: 2023-05-25

## 2023-05-25 RX ADMIN — Medication 100 MG: at 14:07

## 2023-05-25 RX ADMIN — FENTANYL CITRATE 100 MCG: 50 INJECTION, SOLUTION INTRAMUSCULAR; INTRAVENOUS at 14:05

## 2023-05-25 RX ADMIN — AMPICILLIN SODIUM AND SULBACTAM SODIUM 3 G: 2; 1 INJECTION, POWDER, FOR SOLUTION INTRAMUSCULAR; INTRAVENOUS at 14:01

## 2023-05-25 RX ADMIN — Medication 20 MG: at 15:05

## 2023-05-25 RX ADMIN — ALBUTEROL SULFATE 6 PUFF: 108 INHALANT RESPIRATORY (INHALATION) at 14:20

## 2023-05-25 RX ADMIN — PROPOFOL 300 MG: 10 INJECTION, EMULSION INTRAVENOUS at 14:07

## 2023-05-25 RX ADMIN — SUGAMMADEX 400 MG: 100 INJECTION, SOLUTION INTRAVENOUS at 15:19

## 2023-05-25 RX ADMIN — SODIUM CHLORIDE, POTASSIUM CHLORIDE, SODIUM LACTATE AND CALCIUM CHLORIDE: 600; 310; 30; 20 INJECTION, SOLUTION INTRAVENOUS at 14:01

## 2023-05-25 RX ADMIN — LIDOCAINE HYDROCHLORIDE 80 MG: 20 INJECTION, SOLUTION INFILTRATION; PERINEURAL at 14:06

## 2023-05-25 RX ADMIN — DEXAMETHASONE SODIUM PHOSPHATE 10 MG: 4 INJECTION, SOLUTION INTRA-ARTICULAR; INTRALESIONAL; INTRAMUSCULAR; INTRAVENOUS; SOFT TISSUE at 14:49

## 2023-05-25 RX ADMIN — ONDANSETRON 4 MG: 2 INJECTION INTRAMUSCULAR; INTRAVENOUS at 15:06

## 2023-05-25 RX ADMIN — PROPOFOL 150 MCG/KG/MIN: 10 INJECTION, EMULSION INTRAVENOUS at 14:22

## 2023-05-25 RX ADMIN — Medication 20 MG: at 15:00

## 2023-05-25 RX ADMIN — PROPOFOL 50 MG: 10 INJECTION, EMULSION INTRAVENOUS at 14:39

## 2023-05-25 RX ADMIN — Medication 30 MG: at 14:43

## 2023-05-25 ASSESSMENT — ACTIVITIES OF DAILY LIVING (ADL)
ADLS_ACUITY_SCORE: 35

## 2023-05-25 NOTE — ANESTHESIA PROCEDURE NOTES
Airway       Patient location during procedure: OR       Procedure Start/Stop Times: 5/25/2023 2:10 PM  Staff -        CRNA: Iván Gonzalez APRN CRNA       Performed By: other anesthesia staff  Consent for Airway        Urgency: elective  Indications and Patient Condition       Indications for airway management: barbara-procedural       Induction type:intravenous       Mask difficulty assessment: 3 - difficult mask (inadequate, unstable, or two providers) +/- NMBA (2 person bag/mask.)    Final Airway Details       Final airway type: endotracheal airway       Successful airway: ETT - single  Endotracheal Airway Details        ETT size (mm): 5.0       Cuffed: yes       Successful intubation technique: direct laryngoscopy (by dr bertrand)       : rigid bronch by dr bertrand.       Grade View of Cords: 1 (with rigid bronch)    Post intubation assessment        Placement verified by: capnometry, equal breath sounds and chest rise        Number of attempts at approach: 1       Number of other approaches attempted: 0       Ease of procedure: easy (by surgeon)       Dentition: Intact and Unchanged       Dental guard used and removed. : ent dental guard.    Medication(s) Administered   Medication Administration Time: 5/25/2023 2:10 PM

## 2023-05-25 NOTE — ANESTHESIA CARE TRANSFER NOTE
Patient: Deepthi Aj    Procedure: Procedure(s):  Microdirect laryngoscopy with incision, CRE balloon dilation, CO2 laser for subglottic stenosis, steroid injection       Diagnosis: Subglottic stenosis [J38.6]  Granulomatosis with polyangiitis without renal involvement (H) [M31.30]  Morbid obesity (H) [E66.01]  Diagnosis Additional Information: No value filed.    Anesthesia Type:   General     Note:    Oropharynx: oropharynx clear of all foreign objects and spontaneously breathing  Level of Consciousness: awake  Oxygen Supplementation: face mask  Level of Supplemental Oxygen (L/min / FiO2): 8  Independent Airway: airway patency satisfactory and stable  Dentition: dentition unchanged  Vital Signs Stable: post-procedure vital signs reviewed and stable  Report to RN Given: handoff report given  Patient transferred to: PACU    Handoff Report: Identifed the Patient, Identified the Reponsible Provider, Reviewed the pertinent medical history, Discussed the surgical course, Reviewed Intra-OP anesthesia mangement and issues during anesthesia, Set expectations for post-procedure period and Allowed opportunity for questions and acknowledgement of understanding      Vitals:  Vitals Value Taken Time   /65 05/25/23 1615   Temp 36.4  C (97.6  F) 05/25/23 1600   Pulse 62 05/25/23 1618   Resp 22 05/25/23 1618   SpO2 100 % 05/25/23 1626   Vitals shown include unvalidated device data.    Electronically Signed By: CHARLEY Santos CRNA  May 25, 2023  3:35 PM

## 2023-05-25 NOTE — DISCHARGE INSTRUCTIONS
[facility]  Same-Day Surgery   Adult Discharge Orders & Instructions     For 24 hours after surgery    Get plenty of rest.  A responsible adult must stay with you for at least 24 hours after you leave the hospital.   Do not drive or use heavy equipment.  If you have weakness or tingling, don't drive or use heavy equipment until this feeling goes away.  Do not drink alcohol.  Avoid strenuous or risky activities.  Ask for help when climbing stairs.   You may feel lightheaded.  IF so, sit for a few minutes before standing.  Have someone help you get up.   If you have nausea (feel sick to your stomach): Drink only clear liquids such as apple juice, ginger ale, broth or 7-Up.  Rest may also help.  Be sure to drink enough fluids.  Move to a regular diet as you feel able.  You may have a slight fever. Call the doctor if your fever is over 100 F (37.7 C) (taken under the tongue) or lasts longer than 24 hours.  You may have a dry mouth, a sore throat, muscle aches or trouble sleeping.  These should go away after 24 hours.  Do not make important or legal decisions.   Call your doctor for any of the followin.  Signs of infection (fever, growing tenderness at the surgery site, a large amount of drainage or bleeding, severe pain, foul-smelling drainage, redness, swelling).    2. It has been over 8 to 10 hours since surgery and you are still not able to urinate (pass water).    3.  Headache for over 24 hours.    4.  Numbness, tingling or weakness the day after surgery (if you had spinal anesthesia).  To contact a doctor, call Dr Valadez at  the ENT- Otolaryngology Clinic at [phone]   or:    '   [phone] and ask for the resident on call for   ENT (answered 24 hours a day)  '   Emergency Department:    [facility]: [phone]       (TTY for hearing impaired: [phone])

## 2023-05-25 NOTE — OP NOTE
PROCEDURE(S):  Microdirect laryngoscopy  CO2 laser incision of subglottic stenosis under microscopic visualization  Steroid injection to subglottic stenosis under telescopic visualization  Balloon dilation of subglottic stenosis under telescopic visualization    PRE-OPERATIVE DIAGNOSIS:   Subglottic Stenosis    POST-OPERATIVE DIAGNOSIS:   As above    SURGEON: Kelsey Valadez MD MPH    ASSISTANT(S): Bere Matthews MD, resident    ANAESTHESIA: General, with intermittent endolaryngeal/endotracheal intubation and jet ventilation    INDICATIONS FOR PROCEDURE:   Deepthi Aj is a 32 year old year old female with a history of dyspnea and GPA who was noted to have recurrent subglottic stenosis. After the risks, benefits, and alternatives had been discussed, the patient wished to proceed with this procedure and presented for the procedure(s) listed above.    DESCRIPTION OF PROCEDURE:   The patient was brought to the operating room and placed supine. A time-out was called to verify patient identity, operative site, and planned procedure. The operative site was prepped in the usual clean fashion. Moist gauze eye pads were placed and secured. A head wrap was placed, and custom molded thermoplastic tooth guards were placed. General anesthesia was induced with mask ventilation by Anesthesia. Direct laryngoscopy was then performed with an Ossoff-Pilling laryngoscope, which was placed in suspension. Ventilation was established via placement of endotracheal tube through the laryngoscope and good chest rise was observed. The vocal folds were examined with 0 degree telescope and the area of stenosis was noted to begin 11 mm below the vocal folds with a length of approximately 9 mm. The diameter of the stenotic opening was approximately 9 mm. The laryngoscope was slightly advanced to allow improved access to the area of stenosis. Tracheoscopy revealed no additional lesions or areas of narrowing.    The operating microscope was  then brought into position. Laser safety precautions were utilized at all times, including eye protection for the patient and staff, use of wet towels, and removing the endotracheal tube and/or holding jet during laser use. A laser safety checklist was completed by all staff in the room. As needed, moistened cottonoids or laser-safe backstops were used. The Lumenis CO2 Accublade laser was attached to the microscope and used at a depth setting of 2 and 8 Singh. Incisions were made in the most prominent areas of the stenosis, at 12, 6, and 9 o'clock. The endotracheal tube was replaced as needed to maintain good respiratory support. The patient was observed to desaturate quickly, but consistently recover almost immediately when the endotracheal tube was replaced. High frequency jet ventilation was tried during times when the endotracheal tube was removed, but was not consistently used as it did not seem to make a substantial impact on desaturation time. Frequent pauses in the case were needed to ensure adequate oxygenation. Cottonoids soaked in 1:10,000 epinephrine were sparingly used to maintain hemostasis. The operating microscope was then moved out of position.    After the incisions were completed, ~1.5 cc of Kenalog-40 was injected into all quadrants of the stenosis under telescopic visualization.    A Cre pulmonary dilating balloon was then used to dilate the stenotic site under telescopic visualization. The balloon was inflated to approximately 4.5 wisam, which corresponds to approximately 13.5 mm diameter, and held for a total of 2 minutes with brief breaks for ventilation and reperfusion.    As needed during the procedure and at the conclusion of the procedure, the operating microscope was moved out of position and the 0 degree rigid endoscope was again used to examine the airway and confirm completion of the stated objectives of the procedure. After cottonoid and sponge counts were confirmed correct, 2 cc of 4%  lidocaine were applied transglottically for laryngotracheal anesthesia. The laryngoscope and tooth guards were removed. The lips, teeth, and tongue were examined and no injuries were noted. Care of the patient was returned to Anesthesia.      FINDINGS:   Good mask, good laryngeal exposure.   Subglottic stenosis, starting 11 mm below the vocal folds, 9 mm long. Maximal diameter 9 mm.     SPECIMEN(S):   None    DRAINS: None    ESTIMATED BLOOD LOSS: Minimal    COMPLICATIONS: None apparent    DISPOSITION: Stable to PACU    ATTENDING PRESENCE STATEMENT:  I was present and participating for the entire procedure from beginning to completion.

## 2023-05-25 NOTE — BRIEF OP NOTE
[facility]    Brief Operative Note    Pre-operative diagnosis: Subglottic stenosis [J38.6]  Granulomatosis with polyangiitis without renal involvement (H) [M31.30]  Morbid obesity (H) [E66.01]  Post-operative diagnosis Same as pre-operative diagnosis    Procedure: Procedure(s):  Microdirect laryngoscopy with incision, CRE balloon dilation, CO2 laser for subglottic stenosis, steroid injection  Surgeon: Surgeon(s) and Role:     * Kelsey Valaedz MD - Primary     * Bere Matthews MD - Resident - Assisting  Anesthesia: General   Estimated Blood Loss: Minimal    Drains: None  Specimens: * No specimens in log *  Findings:   Moderate SGS 11mm below true vocal folds, resected with CO2 laser and dilated x2 up to 13.5mm diameter with balloon. Injected 1.5cc kenalog into stenotic area,   Complications: None.  Implants: * No implants in log *

## 2023-05-25 NOTE — ANESTHESIA PREPROCEDURE EVALUATION
Anesthesia Pre-Procedure Evaluation    Patient: Deepthi Aj   MRN: [id number] : 1990        Procedure : Procedure(s):  Microdirect laryngoscopy with incision, possible dilation, possible CO2 laser for subglottic stenosis  Inject steroid (location)          Past Medical History:   Diagnosis Date     Difficult intubation      Granulomatosis with polyangiitis (H)      Subglottic stenosis      Uncomplicated asthma       Past Surgical History:   Procedure Laterality Date     INJECT STEROID (LOCATION) N/A 2022    Procedure: steroid injection;  Surgeon: Kelsey Valadez MD;  Location: [facility] OR     LARYNGOSCOPY, FLEXIBLE WITH INJECTION N/A 2023    Procedure: Transnasal flexible laryngoscopy with steroid injection for subglottic stenosis;  Surgeon: Kelsey Valadez MD;  Location: [facility] OR     LASER CO2 LARYNGOSCOPY N/A 2022    Procedure: Microdirect laryngoscopy with CO2 Laser incision, dilation, for subglottic stenosis;  Surgeon: Kelsey Valadez MD;  Location: [facility] OR      Allergies   Allergen Reactions     Metformin Diarrhea and Nausea and Vomiting      Social History     Tobacco Use     Smoking status: Never     Smokeless tobacco: Never   Vaping Use     Vaping status: Not on file   Substance Use Topics     Alcohol use: Yes      Wt Readings from Last 1 Encounters:   23 (!) 206.4 kg (455 lb)        Anesthesia Evaluation   Pt has had prior anesthetic. Type of anesthetic: Has not been intubated in past and no reported difficult hx of intubation.        ROS/MED HX  ENT/Pulmonary: Comment: Subglottic stenosis 2/2 GPA, on 7.5 mg prednisone for last 2 months    (+) MCKENZIE risk factors, hypertension, obese, Intermittent, asthma     Neurologic:  - neg neurologic ROS     Cardiovascular:     (+) hypertension-----Previous cardiac testing   Echo: Date: 22 Results:  Left ventricular ejection fraction is visually estimated at 60%.   No significant valvular abnormalities were  identified.   Pulmonary artery systolic pressure estimate is 27mmHg above RAP.   (Normal) .   Stress Test: Date: Results:    ECG Reviewed: Date: Results:    Cath: Date: Results:      METS/Exercise Tolerance:     Hematologic:  - neg hematologic  ROS     Musculoskeletal:  - neg musculoskeletal ROS     GI/Hepatic:     (+) GERD, Asymptomatic on medication,     Renal/Genitourinary:  - neg Renal ROS     Endo:  - neg endo ROS   (+) Obesity,     Psychiatric/Substance Use:  - neg psychiatric ROS     Infectious Disease:       Malignancy:       Other:            Physical Exam    Airway        Mallampati: III   TM distance: > 3 FB   Neck ROM: full   Mouth opening: > 3 cm    Respiratory Devices and Support         Dental       (+) Completely normal teeth      Cardiovascular   cardiovascular exam normal       Rhythm and rate: regular and normal     Pulmonary   pulmonary exam normal        breath sounds clear to auscultation           OUTSIDE LABS:  CBC: No results found for: WBC, HGB, HCT, PLT  BMP:   Lab Results   Component Value Date    GLC 94 11/23/2022     COAGS: No results found for: PTT, INR, FIBR  POC: No results found for: BGM, HCG, HCGS  HEPATIC: No results found for: ALBUMIN, PROTTOTAL, ALT, AST, GGT, ALKPHOS, BILITOTAL, BILIDIRECT, KELLY  OTHER: No results found for: PH, LACT, A1C, EBONIE, PHOS, MAG, LIPASE, AMYLASE, TSH, T4, T3, CRP, SED    Anesthesia Plan    ASA Status:  3   NPO Status:  NPO Appropriate    Anesthesia Type: General.     - Airway: ETT   Induction: Intravenous.   Maintenance: Balanced.        Consents    Anesthesia Plan(s) and associated risks, benefits, and realistic alternatives discussed. Questions answered and patient/representative(s) expressed understanding.     - Discussed: Risks, Benefits and Alternatives for BOTH SEDATION and the PROCEDURE were discussed     - Discussed with:  Patient      - Extended Intubation/Ventilatory Support Discussed: No.      - Patient is DNR/DNI Status: No    Use of blood  products discussed: No .     Postoperative Care    Pain management: IV analgesics.   PONV prophylaxis: Ondansetron (or other 5HT-3), Dexamethasone or Solumedrol     Comments:                Lyndon Oseguera DO

## 2023-05-26 NOTE — ANESTHESIA POSTPROCEDURE EVALUATION
Patient: Deepthi Aj    Procedure: Procedure(s):  Microdirect laryngoscopy with incision, CRE balloon dilation, CO2 laser for subglottic stenosis, steroid injection       Anesthesia Type:  General    Note:  Disposition: Outpatient   Postop Pain Control: Uneventful            Sign Out: Well controlled pain   PONV: No   Neuro/Psych: Uneventful            Sign Out: Acceptable/Baseline neuro status   Airway/Respiratory: Uneventful            Sign Out: Acceptable/Baseline resp. status   CV/Hemodynamics: Uneventful            Sign Out: Acceptable CV status; No obvious hypovolemia; No obvious fluid overload   Other NRE: NONE   DID A NON-ROUTINE EVENT OCCUR? No           Last vitals:  Vitals Value Taken Time   /65 05/25/23 1615   Temp 36.4  C (97.6  F) 05/25/23 1600   Pulse 62 05/25/23 1618   Resp 22 05/25/23 1618   SpO2 100 % 05/25/23 1626   Vitals shown include unvalidated device data.    Electronically Signed By: Dre Tavarez MD  May 26, 2023  2:33 AM

## 2023-06-19 ENCOUNTER — OFFICE VISIT (OUTPATIENT)
Dept: OTOLARYNGOLOGY | Facility: CLINIC | Age: 33
End: 2023-06-19
Payer: COMMERCIAL

## 2023-06-19 VITALS — HEIGHT: 71 IN | WEIGHT: 293 LBS | BODY MASS INDEX: 41.02 KG/M2

## 2023-06-19 DIAGNOSIS — J38.6 SUBGLOTTIC STENOSIS: Primary | ICD-10-CM

## 2023-06-19 DIAGNOSIS — M31.30 GRANULOMATOSIS WITH POLYANGIITIS WITHOUT RENAL INVOLVEMENT (H): ICD-10-CM

## 2023-06-19 DIAGNOSIS — R06.00 DYSPNEA, UNSPECIFIED TYPE: ICD-10-CM

## 2023-06-19 PROCEDURE — 31575 DIAGNOSTIC LARYNGOSCOPY: CPT | Performed by: OTOLARYNGOLOGY

## 2023-06-19 PROCEDURE — 99213 OFFICE O/P EST LOW 20 MIN: CPT | Mod: 25 | Performed by: OTOLARYNGOLOGY

## 2023-06-19 RX ORDER — AZELASTINE HYDROCHLORIDE, FLUTICASONE PROPIONATE 137; 50 UG/1; UG/1
SPRAY, METERED NASAL
COMMUNITY
Start: 2023-06-10

## 2023-06-19 ASSESSMENT — PAIN SCALES - GENERAL: PAINLEVEL: NO PAIN (0)

## 2023-06-19 NOTE — LETTER
6/19/2023      RE: Deepthi Aj  [address] [facility] [address]       Dear Colleague:    Deepthi Aj recently returned for follow-up at the [facility]. My clinic note from our visit is enclosed below.  Speech recognition software may have been used in the documentation below; input is reviewed before signature to the best of my ability.     I appreciate the ongoing opportunity to participate in this patient's care.    Please feel free to contact me with any questions.    Sincerely yours,      Kelsey Valadez M.D., M.P.H.  , Laryngology  Director, [facility]  Otolaryngology- Head & Neck Surgery  [phone]            =====  HISTORY OF PRESENT ILLNESS:  Deepthi Aj is a pleasant 32-year-old female with subglottic stenosis secondary to GPA who returns in follow up today.     Procedure history:  11/23/22, 5/25/23: Microdirect laryngoscopy with CO2 laser incision, steroid injection, and dilation, for subglottic stenosis  1/19/23, 4/13/23: Flexible fiberoptic laryngoscopy with steroid injection      Today's updates:  - Breathing and overall are feeling very good.  - Peak flows are now up to 500.  - She has had no problems post-op, but she did have mild throat soreness.        MEDICATIONS:     Current Outpatient Medications   Medication Sig Dispense Refill    Avacopan 10 MG CAPS 3 capsules 2 times daily      azaTHIOprine (IMURAN) 50 MG tablet Take 50 mg by mouth daily      azelastine-fluticasone (DYMISTA) 137-50 MCG/ACT nasal spray       budesonide (PULMICORT) 0.5 MG/2ML neb solution Take 2 mLs (0.5 mg) by nebulization as needed (as needed up to 4 times daily to help manage subglottic stenosis) 240 mL 4    Calcium Carb-Cholecalciferol (CALCIUM 1000 + D) 1000-20 MG-MCG TABS Take 1 tablet by mouth daily      cholecalciferol 125 MCG (5000 UT) CAPS Take 1 capsule by mouth 2 times daily      labetalol (NORMODYNE) 200 MG tablet Take 200 mg by  mouth 2 times daily      naproxen (NAPROSYN) 500 MG tablet TAKE ONE TABLET BY MOUTH TWICE DAILY AS NEEDED      phentermine (ADIPEX-P) 37.5 MG capsule Take 37.5 mg by mouth      predniSONE (DELTASONE) 5 MG tablet Take 7.5 mg by mouth daily      Probiotic Product (PROBIOTIC DAILY PO)       riTUXimab (RITUXAN) 10 MG/ML injection       sodium chloride 0.9 % neb solution Take 5 mLs by nebulization as needed for other (as needed up to 4 times per day to manage subglottic stenosis) 600 mL 4    sulfamethoxazole-trimethoprim (BACTRIM) 400-80 MG tablet Take 1 tablet by mouth daily      vitamin C (ASCORBIC ACID) 500 MG tablet Take 500 mg by mouth daily      acetaminophen (TYLENOL) 325 MG tablet Take 1-2 tablets (325-650 mg) by mouth every 6 hours as needed for mild pain 30 tablet 0    albuterol (PROAIR HFA/PROVENTIL HFA/VENTOLIN HFA) 108 (90 Base) MCG/ACT inhaler Inhale 1-2 puffs into the lungs         ALLERGIES:    Allergies   Allergen Reactions    Metformin Diarrhea and Nausea and Vomiting       NEW PMH/PSH: None    REVIEW OF SYSTEMS:  The patient completed a comprehensive 11 point review of systems (below), which was reviewed. Positives are as noted below.  Patient Supplied Answers to Review of Systems      6/15/2023     7:09 AM    ENT ROS   Constitutional Problems with sleep         PHYSICAL EXAM:  General: The patient was alert and conversant, and in no acute distress.    Oral cavity/oropharynx: No masses or lesions. Dentition unchanged since prior. Tongue mobility and palate elevation intact and symmetric.  Neck: No palpable cervical lymphadenopathy, no significant tenderness to palpation of the thyrohyoid space, which was narrow. No obvious thyroid abnormality.  Resp: Breathing comfortably, no stridor or stertor.  Neuro: Symmetric facial function. Other cranial nerve function as documented above.  Psych: Normal affect, pleasant and cooperative.  Voice/speech: No significant dysphonia.       Last 2 Scores for  "Patient-Answered CSI Questionnaire      5/2/2023     7:08 PM 6/15/2023     7:11 AM   CSI Total Score   CSI Total Score 19 29       Procedure:   Flexible fiberoptic laryngoscopy  Indications: This procedure was warranted to evaluate the patient's laryngeal anatomy and function. Risks, benefits, and alternatives were discussed.  Description: After written informed consent was obtained, a time-out was performed to confirm patient identity, procedure, and procedure site. Topical 3% lidocaine with 0.25% phenylephrine was applied to the nasal cavities. 3.5 ml of 2% lidocaine was also topically applied via channeled endoscope. I performed the endoscopy and no complications were apparent.  Performed by: Kelsey Valadez MD MPH  Findings: Normal nasopharynx. Normal base of tongue, valleculae, and epiglottis. The right true vocal fold demonstrated normal mobility. The left true vocal fold demonstrated normal mobility. The medial edges of the true vocal folds appeared smooth and straight.   Glissade produced appropriate elongation. Mucosa of the false vocal folds, aryepiglottic folds, piriform sinuses, and posterior glottis unremarkable. Airway was patent. Minimal subglottic stenosis. Upper trachea patent. Patient did not tolerate evaluation of lower trachea or eva despite application of topical lidocaine directly to the airway.                    IMPRESSION AND PLAN:   Deepthi Aj returns with substantially improved airway caliber. She is feeling \"great\" both respect to breathing and overall. Given the difficulty with achieving adequate topical anesthesia, we agreed to hold off on awake steroid injections. She will continue her steroid inhaler. She asked about marijuana derivatives and we discussed that there is not much in the literature about the impact of marijuana on various diagnoses including subglottic stenosis, but it would be good to avoid smoking or vaping. She was already thinking along similar lines and has " tried edibles which help with various pains in her body, making her wonder if inflammation could be reduced more broadly.    She will plan to return to clinic in 2-3 months, or sooner as needed. She will continue to check peak flows. She also has an upcoming rheumatology visit. I appreciate the opportunity to participate in the care of this pleasant patient.     I spent a total of 23 minutes on 6/19/2023 in chart review, review of tests, patient visit, documentation, care coordination, and/or discussion with other providers about the issues documented above, separate from any documented procedure(s).        Kelsey Valadez MD

## 2023-06-19 NOTE — PROGRESS NOTES
Dear Colleague:    Deepthi Aj recently returned for follow-up at the [facility]. My clinic note from our visit is enclosed below.  Speech recognition software may have been used in the documentation below; input is reviewed before signature to the best of my ability.     I appreciate the ongoing opportunity to participate in this patient's care.    Please feel free to contact me with any questions.    Sincerely yours,      Kelsey Valadez M.D., M.P.H.  , Laryngology  Director, [facility]  Otolaryngology- Head & Neck Surgery  [phone]            =====  HISTORY OF PRESENT ILLNESS:  Deepthi Aj is a pleasant 32-year-old female with subglottic stenosis secondary to GPA who returns in follow up today.     Procedure history:  11/23/22, 5/25/23: Microdirect laryngoscopy with CO2 laser incision, steroid injection, and dilation, for subglottic stenosis  1/19/23, 4/13/23: Flexible fiberoptic laryngoscopy with steroid injection      Today's updates:  - Breathing and overall are feeling very good.  - Peak flows are now up to 500.  - She has had no problems post-op, but she did have mild throat soreness.        MEDICATIONS:     Current Outpatient Medications   Medication Sig Dispense Refill     Avacopan 10 MG CAPS 3 capsules 2 times daily       azaTHIOprine (IMURAN) 50 MG tablet Take 50 mg by mouth daily       azelastine-fluticasone (DYMISTA) 137-50 MCG/ACT nasal spray        budesonide (PULMICORT) 0.5 MG/2ML neb solution Take 2 mLs (0.5 mg) by nebulization as needed (as needed up to 4 times daily to help manage subglottic stenosis) 240 mL 4     Calcium Carb-Cholecalciferol (CALCIUM 1000 + D) 1000-20 MG-MCG TABS Take 1 tablet by mouth daily       cholecalciferol 125 MCG (5000 UT) CAPS Take 1 capsule by mouth 2 times daily       labetalol (NORMODYNE) 200 MG tablet Take 200 mg by mouth 2 times daily       naproxen (NAPROSYN) 500 MG tablet TAKE ONE TABLET BY  MOUTH TWICE DAILY AS NEEDED       phentermine (ADIPEX-P) 37.5 MG capsule Take 37.5 mg by mouth       predniSONE (DELTASONE) 5 MG tablet Take 7.5 mg by mouth daily       Probiotic Product (PROBIOTIC DAILY PO)        riTUXimab (RITUXAN) 10 MG/ML injection        sodium chloride 0.9 % neb solution Take 5 mLs by nebulization as needed for other (as needed up to 4 times per day to manage subglottic stenosis) 600 mL 4     sulfamethoxazole-trimethoprim (BACTRIM) 400-80 MG tablet Take 1 tablet by mouth daily       vitamin C (ASCORBIC ACID) 500 MG tablet Take 500 mg by mouth daily       acetaminophen (TYLENOL) 325 MG tablet Take 1-2 tablets (325-650 mg) by mouth every 6 hours as needed for mild pain 30 tablet 0     albuterol (PROAIR HFA/PROVENTIL HFA/VENTOLIN HFA) 108 (90 Base) MCG/ACT inhaler Inhale 1-2 puffs into the lungs         ALLERGIES:    Allergies   Allergen Reactions     Metformin Diarrhea and Nausea and Vomiting       NEW PMH/PSH: None    REVIEW OF SYSTEMS:  The patient completed a comprehensive 11 point review of systems (below), which was reviewed. Positives are as noted below.  Patient Supplied Answers to Review of Systems      6/15/2023     7:09 AM    ENT ROS   Constitutional Problems with sleep         PHYSICAL EXAM:  General: The patient was alert and conversant, and in no acute distress.    Oral cavity/oropharynx: No masses or lesions. Dentition unchanged since prior. Tongue mobility and palate elevation intact and symmetric.  Neck: No palpable cervical lymphadenopathy, no significant tenderness to palpation of the thyrohyoid space, which was narrow. No obvious thyroid abnormality.  Resp: Breathing comfortably, no stridor or stertor.  Neuro: Symmetric facial function. Other cranial nerve function as documented above.  Psych: Normal affect, pleasant and cooperative.  Voice/speech: No significant dysphonia.       Last 2 Scores for Patient-Answered CSI Questionnaire      5/2/2023     7:08 PM 6/15/2023      "7:11 AM   CSI Total Score   CSI Total Score 19 29       Procedure:   Flexible fiberoptic laryngoscopy  Indications: This procedure was warranted to evaluate the patient's laryngeal anatomy and function. Risks, benefits, and alternatives were discussed.  Description: After written informed consent was obtained, a time-out was performed to confirm patient identity, procedure, and procedure site. Topical 3% lidocaine with 0.25% phenylephrine was applied to the nasal cavities. 3.5 ml of 2% lidocaine was also topically applied via channeled endoscope. I performed the endoscopy and no complications were apparent.  Performed by: Kelsey Valadez MD MPH  Findings: Normal nasopharynx. Normal base of tongue, valleculae, and epiglottis. The right true vocal fold demonstrated normal mobility. The left true vocal fold demonstrated normal mobility. The medial edges of the true vocal folds appeared smooth and straight.   Glissade produced appropriate elongation. Mucosa of the false vocal folds, aryepiglottic folds, piriform sinuses, and posterior glottis unremarkable. Airway was patent. Minimal subglottic stenosis. Upper trachea patent. Patient did not tolerate evaluation of lower trachea or eva despite application of topical lidocaine directly to the airway.                    IMPRESSION AND PLAN:   Deepthi Aj returns with substantially improved airway caliber. She is feeling \"great\" both respect to breathing and overall. Given the difficulty with achieving adequate topical anesthesia, we agreed to hold off on awake steroid injections. She will continue her steroid inhaler. She asked about marijuana derivatives and we discussed that there is not much in the literature about the impact of marijuana on various diagnoses including subglottic stenosis, but it would be good to avoid smoking or vaping. She was already thinking along similar lines and has tried edibles which help with various pains in her body, making her wonder " if inflammation could be reduced more broadly.    She will plan to return to clinic in 2-3 months, or sooner as needed. She will continue to check peak flows. She also has an upcoming rheumatology visit. I appreciate the opportunity to participate in the care of this pleasant patient.     I spent a total of 23 minutes on 6/19/2023 in chart review, review of tests, patient visit, documentation, care coordination, and/or discussion with other providers about the issues documented above, separate from any documented procedure(s).

## 2023-06-19 NOTE — PATIENT INSTRUCTIONS
1.  You were seen in the ENT Clinic today by . If you have any questions or concerns after your appointment, please call [phone]. Press option #1 for scheduling related needs. Press option #3 for Nurse advice.    2.   has recommended the following:   - continue inhaler   - hold off on awake injections    3.  Plan is to return to clinic 2-3 months      Lala Tang LPN  [phone]  [facility] - Otolaryngology

## 2023-10-23 ENCOUNTER — OFFICE VISIT (OUTPATIENT)
Dept: OTOLARYNGOLOGY | Facility: CLINIC | Age: 33
End: 2023-10-23
Payer: COMMERCIAL

## 2023-10-23 VITALS — SYSTOLIC BLOOD PRESSURE: 138 MMHG | DIASTOLIC BLOOD PRESSURE: 87 MMHG | HEART RATE: 93 BPM

## 2023-10-23 DIAGNOSIS — R06.00 DYSPNEA, UNSPECIFIED TYPE: ICD-10-CM

## 2023-10-23 DIAGNOSIS — J38.6 SUBGLOTTIC STENOSIS: Primary | ICD-10-CM

## 2023-10-23 DIAGNOSIS — M31.30 GRANULOMATOSIS WITH POLYANGIITIS WITHOUT RENAL INVOLVEMENT (H): ICD-10-CM

## 2023-10-23 PROCEDURE — 31575 DIAGNOSTIC LARYNGOSCOPY: CPT | Performed by: OTOLARYNGOLOGY

## 2023-10-23 PROCEDURE — 99213 OFFICE O/P EST LOW 20 MIN: CPT | Mod: 25 | Performed by: OTOLARYNGOLOGY

## 2023-10-23 ASSESSMENT — PAIN SCALES - GENERAL: PAINLEVEL: NO PAIN (0)

## 2023-10-23 NOTE — PATIENT INSTRUCTIONS
1.  You were seen in the ENT Clinic today by Dr. Valadez. If you have any questions or concerns after your appointment, please call [phone]. Press option #1 for scheduling related needs. Press option #3 for Nurse advice.    2.  Dr. Valadez has recommended the following:   - Resume inhaled steroids   - Monitor reflux symptoms and let office know if symptoms get worse    3.  Plan is to return to clinic 3-4 months      Dana Barron RN  [phone]  [facility] - Otolaryngology

## 2023-10-23 NOTE — LETTER
10/23/2023      RE: Deepthi Aj  [address] [facility] [address]       Dear Colleague:  Deepthi Aj recently returned for follow-up at the [facility]. My clinic note from our visit is enclosed below.  Speech recognition software may have been used in the documentation below; input is reviewed before signature to the best of my ability.     I appreciate the ongoing opportunity to participate in this patient's care.    Please feel free to contact me with any questions.      Sincerely yours,  Kelsey Valadez M.D., M.P.H.  , Laryngology  Director, 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     Procedure history:  11/23/22, 5/25/23: Microdirect laryngoscopy with CO2 laser incision, steroid injection, and dilation, for subglottic stenosis  1/19/23, 4/13/23: Flexible fiberoptic laryngoscopy with steroid injection    Today's updates:  - Breathing overall has been good, but a few weeks ago noticed that things were feeling tighter  - Was getting short of breath getting dressed etc.  - Also had a URI  - Peak flows were down to 350; now back up to 400, but still feels a little tighter than is comfortable  - PFTs recently - ongoing flattening of her flow volume loop. CXR negative. (Dr Burns, 10/12/23)      MEDICATIONS:     Current Outpatient Medications   Medication Sig Dispense Refill    Avacopan 10 MG CAPS 3 capsules 2 times daily      azaTHIOprine (IMURAN) 50 MG tablet Take 50 mg by mouth daily      azelastine-fluticasone (DYMISTA) 137-50 MCG/ACT nasal spray       budesonide (PULMICORT) 0.5 MG/2ML neb solution Take 2 mLs (0.5 mg) by nebulization as needed (as needed up to 4 times daily to help manage subglottic stenosis) 240 mL 4    Calcium  Carb-Cholecalciferol (CALCIUM 1000 + D) 1000-20 MG-MCG TABS Take 1 tablet by mouth daily      cholecalciferol 125 MCG (5000 UT) CAPS Take 1 capsule by mouth 2 times daily      labetalol (NORMODYNE) 200 MG tablet Take 200 mg by mouth 2 times daily      naproxen (NAPROSYN) 500 MG tablet TAKE ONE TABLET BY MOUTH TWICE DAILY AS NEEDED      phentermine (ADIPEX-P) 37.5 MG capsule Take 37.5 mg by mouth      predniSONE (DELTASONE) 5 MG tablet Take 7.5 mg by mouth daily      Probiotic Product (PROBIOTIC DAILY PO)       riTUXimab (RITUXAN) 10 MG/ML injection       sodium chloride 0.9 % neb solution Take 5 mLs by nebulization as needed for other (as needed up to 4 times per day to manage subglottic stenosis) 600 mL 4    sulfamethoxazole-trimethoprim (BACTRIM) 400-80 MG tablet Take 1 tablet by mouth daily      vitamin C (ASCORBIC ACID) 500 MG tablet Take 500 mg by mouth daily      albuterol (PROAIR HFA/PROVENTIL HFA/VENTOLIN HFA) 108 (90 Base) MCG/ACT inhaler Inhale 1-2 puffs into the lungs         ALLERGIES:    Allergies   Allergen Reactions    Metformin Diarrhea and Nausea and Vomiting       NEW PMH/PSH: None    REVIEW OF SYSTEMS:  The patient completed a comprehensive 11 point review of systems (below), which was reviewed. Positives are as noted below.  Patient Supplied Answers to Review of Systems      6/15/2023     7:09 AM    ENT ROS   Constitutional Problems with sleep       PHYSICAL EXAM:  General: The patient was alert and conversant, and in no acute distress.    Oral cavity/oropharynx: No masses or lesions. Dentition unchanged since prior. Tongue mobility and palate elevation intact and symmetric.  Resp: Breathing comfortably, no stridor or stertor at rest. Faintly audible rapid inhalation.  Neuro: Symmetric facial function. Other cranial nerve function as documented above.  Psych: Normal affect, pleasant and cooperative.  Voice/speech: No significant dysphonia.      Procedure:   Flexible fiberoptic  laryngoscopy  Indications: This procedure was warranted to evaluate the patient's laryngeal anatomy and function. Risks, benefits, and alternatives were discussed.  Description: After written informed consent was obtained, a time-out was performed to confirm patient identity, procedure, and procedure site. Topical 2% lidocaine and oxymetazoline were applied to the nasal cavities. I performed the endoscopy and no complications were apparent.  Performed by: Kelsey Valadez MD MPH  Findings: Normal nasopharynx. Normal base of tongue, valleculae, and epiglottis. The right true vocal fold demonstrated normal mobility. The left true vocal fold demonstrated normal mobility. The medial edges of the true vocal folds appeared smooth and straight. No focal mucosal lesions were observed on the true vocal folds. Glissade produced appropriate elongation. Mucosa of the false vocal folds, aryepiglottic folds, piriform sinuses, and posterior glottis unremarkable. Airway was patent with stable mild to moderate stenosis, no acute inflammation.                         IMPRESSION AND PLAN:   Deepthi Aj returns with an overall stable exam, possibly increased erythema. Overall her airway has stayed open for longer than typical for her, and she attributes this to her current GPA regimen.    Plan:  - resume inhaled steroids -- needs new prescription.  - will also watch reflux symptoms and let me know if the symptoms worsen; at that point we may want to resume PPIs. Discussed potential impact of reflux on stenosis; also acknowledged some concerns related to long term PPI use and the need to balance priorities.  - RTC 3-4 months or sooner as needed.    I spent a total of 28 minutes on 10/23/2023 in chart review, review of tests, patient visit, documentation, care coordination, and/or discussion with other providers about the issues documented above, separate from any documented procedure(s).      Kelsey Valadez MD

## 2023-10-23 NOTE — PROGRESS NOTES
Dear Colleague:  Deepthi Aj recently returned for follow-up at the [facility]. My clinic note from our visit is enclosed below.  Speech recognition software may have been used in the documentation below; input is reviewed before signature to the best of my ability.     I appreciate the ongoing opportunity to participate in this patient's care.    Please feel free to contact me with any questions.      Sincerely yours,  Kelsey Valadez M.D., M.P.H.  , Laryngology  Director, 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     Procedure history:  11/23/22, 5/25/23: Microdirect laryngoscopy with CO2 laser incision, steroid injection, and dilation, for subglottic stenosis  1/19/23, 4/13/23: Flexible fiberoptic laryngoscopy with steroid injection    Today's updates:  - Breathing overall has been good, but a few weeks ago noticed that things were feeling tighter  - Was getting short of breath getting dressed etc.  - Also had a URI  - Peak flows were down to 350; now back up to 400, but still feels a little tighter than is comfortable  - PFTs recently - ongoing flattening of her flow volume loop. CXR negative. (Dr Burns, 10/12/23)      MEDICATIONS:     Current Outpatient Medications   Medication Sig Dispense Refill    Avacopan 10 MG CAPS 3 capsules 2 times daily      azaTHIOprine (IMURAN) 50 MG tablet Take 50 mg by mouth daily      azelastine-fluticasone (DYMISTA) 137-50 MCG/ACT nasal spray       budesonide (PULMICORT) 0.5 MG/2ML neb solution Take 2 mLs (0.5 mg) by nebulization as needed (as needed up to 4 times daily to help manage subglottic stenosis) 240 mL 4    Calcium Carb-Cholecalciferol (CALCIUM 1000 + D) 1000-20 MG-MCG TABS Take 1 tablet by mouth daily       cholecalciferol 125 MCG (5000 UT) CAPS Take 1 capsule by mouth 2 times daily      labetalol (NORMODYNE) 200 MG tablet Take 200 mg by mouth 2 times daily      naproxen (NAPROSYN) 500 MG tablet TAKE ONE TABLET BY MOUTH TWICE DAILY AS NEEDED      phentermine (ADIPEX-P) 37.5 MG capsule Take 37.5 mg by mouth      predniSONE (DELTASONE) 5 MG tablet Take 7.5 mg by mouth daily      Probiotic Product (PROBIOTIC DAILY PO)       riTUXimab (RITUXAN) 10 MG/ML injection       sodium chloride 0.9 % neb solution Take 5 mLs by nebulization as needed for other (as needed up to 4 times per day to manage subglottic stenosis) 600 mL 4    sulfamethoxazole-trimethoprim (BACTRIM) 400-80 MG tablet Take 1 tablet by mouth daily      vitamin C (ASCORBIC ACID) 500 MG tablet Take 500 mg by mouth daily      albuterol (PROAIR HFA/PROVENTIL HFA/VENTOLIN HFA) 108 (90 Base) MCG/ACT inhaler Inhale 1-2 puffs into the lungs         ALLERGIES:    Allergies   Allergen Reactions    Metformin Diarrhea and Nausea and Vomiting       NEW PMH/PSH: None    REVIEW OF SYSTEMS:  The patient completed a comprehensive 11 point review of systems (below), which was reviewed. Positives are as noted below.  Patient Supplied Answers to Review of Systems      6/15/2023     7:09 AM    ENT ROS   Constitutional Problems with sleep       PHYSICAL EXAM:  General: The patient was alert and conversant, and in no acute distress.    Oral cavity/oropharynx: No masses or lesions. Dentition unchanged since prior. Tongue mobility and palate elevation intact and symmetric.  Resp: Breathing comfortably, no stridor or stertor at rest. Faintly audible rapid inhalation.  Neuro: Symmetric facial function. Other cranial nerve function as documented above.  Psych: Normal affect, pleasant and cooperative.  Voice/speech: No significant dysphonia.      Procedure:   Flexible fiberoptic laryngoscopy  Indications: This procedure was warranted to evaluate the patient's laryngeal anatomy and  function. Risks, benefits, and alternatives were discussed.  Description: After written informed consent was obtained, a time-out was performed to confirm patient identity, procedure, and procedure site. Topical 2% lidocaine and oxymetazoline were applied to the nasal cavities. I performed the endoscopy and no complications were apparent.  Performed by: Kelsey Valadez MD MPH  Findings: Normal nasopharynx. Normal base of tongue, valleculae, and epiglottis. The right true vocal fold demonstrated normal mobility. The left true vocal fold demonstrated normal mobility. The medial edges of the true vocal folds appeared smooth and straight. No focal mucosal lesions were observed on the true vocal folds. Glissade produced appropriate elongation. Mucosa of the false vocal folds, aryepiglottic folds, piriform sinuses, and posterior glottis unremarkable. Airway was patent with stable mild to moderate stenosis, no acute inflammation.                         IMPRESSION AND PLAN:   Deepthi Aj returns with an overall stable exam, possibly increased erythema. Overall her airway has stayed open for longer than typical for her, and she attributes this to her current GPA regimen.    Plan:  - resume inhaled steroids -- needs new prescription.  - will also watch reflux symptoms and let me know if the symptoms worsen; at that point we may want to resume PPIs. Discussed potential impact of reflux on stenosis; also acknowledged some concerns related to long term PPI use and the need to balance priorities.  - RTC 3-4 months or sooner as needed.    I spent a total of 28 minutes on 10/23/2023 in chart review, review of tests, patient visit, documentation, care coordination, and/or discussion with other providers about the issues documented above, separate from any documented procedure(s).

## 2023-10-24 ENCOUNTER — PATIENT OUTREACH (OUTPATIENT)
Dept: OTOLARYNGOLOGY | Facility: CLINIC | Age: 33
End: 2023-10-24
Payer: COMMERCIAL

## 2023-10-24 NOTE — PROGRESS NOTES
Called patient to check if she wanted a steroid for her nebulizer or inhaler. Asked patient calls back on provided direct number or send MyChart with preference. Dana Barron RN on 10/24/2023 at 4:56 PM

## 2023-10-25 DIAGNOSIS — J38.6 SUBGLOTTIC STENOSIS: Primary | ICD-10-CM

## 2023-10-25 RX ORDER — FLUTICASONE PROPIONATE 220 UG/1
2 AEROSOL, METERED RESPIRATORY (INHALATION) 2 TIMES DAILY
Qty: 12 G | Refills: 3 | Status: SHIPPED | OUTPATIENT
Start: 2023-10-25

## 2023-12-26 DIAGNOSIS — J38.6 SUBGLOTTIC STENOSIS: ICD-10-CM

## 2023-12-29 RX ORDER — FLUTICASONE PROPIONATE 220 UG/1
AEROSOL, METERED RESPIRATORY (INHALATION)
Qty: 36 G | Refills: 3 | OUTPATIENT
Start: 2023-12-29

## 2024-01-16 ENCOUNTER — TELEPHONE (OUTPATIENT)
Dept: OTOLARYNGOLOGY | Facility: CLINIC | Age: 34
End: 2024-01-16
Payer: COMMERCIAL

## 2024-01-16 NOTE — TELEPHONE ENCOUNTER
Spoke to representative at pt pharmacy in regards to steroid inhaler not being covered by insurance. Discussed preferred alternatives from provider and one was covered with a copay for patient. Writer will discuss with provider for recommendation or proceed with a prior authorization for medication.

## 2024-01-22 ENCOUNTER — OFFICE VISIT (OUTPATIENT)
Dept: OTOLARYNGOLOGY | Facility: CLINIC | Age: 34
End: 2024-01-22
Payer: COMMERCIAL

## 2024-01-22 VITALS
BODY MASS INDEX: 41.02 KG/M2 | SYSTOLIC BLOOD PRESSURE: 141 MMHG | HEIGHT: 71 IN | DIASTOLIC BLOOD PRESSURE: 89 MMHG | WEIGHT: 293 LBS | HEART RATE: 96 BPM

## 2024-01-22 DIAGNOSIS — M31.30 GRANULOMATOSIS WITH POLYANGIITIS WITHOUT RENAL INVOLVEMENT (H): ICD-10-CM

## 2024-01-22 DIAGNOSIS — J38.6 SUBGLOTTIC STENOSIS: Primary | ICD-10-CM

## 2024-01-22 DIAGNOSIS — E66.01 MORBID OBESITY (H): ICD-10-CM

## 2024-01-22 PROCEDURE — 99213 OFFICE O/P EST LOW 20 MIN: CPT | Mod: 25 | Performed by: OTOLARYNGOLOGY

## 2024-01-22 PROCEDURE — 31575 DIAGNOSTIC LARYNGOSCOPY: CPT | Performed by: OTOLARYNGOLOGY

## 2024-01-22 RX ORDER — IBUPROFEN 200 MG
200-400 TABLET ORAL
COMMUNITY

## 2024-01-22 RX ORDER — CYCLOBENZAPRINE HCL 10 MG
10 TABLET ORAL
COMMUNITY
Start: 2023-08-07

## 2024-01-22 RX ORDER — ASCORBIC ACID 250 MG
1000 TABLET ORAL
COMMUNITY

## 2024-01-22 ASSESSMENT — PAIN SCALES - GENERAL: PAINLEVEL: NO PAIN (0)

## 2024-01-22 NOTE — PROGRESS NOTES
Dear Colleague:  Deepthi Aj recently returned for follow-up at the [facility]. My clinic note from our visit is enclosed below.  Speech recognition software may have been used in the documentation below; input is reviewed before signature to the best of my ability.     I appreciate the ongoing opportunity to participate in this patient's care.    Please feel free to contact me with any questions.      Sincerely yours,  Kelsey Valadez M.D., M.P.H.  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     Procedure history:  11/23/22, 5/25/23: Microdirect laryngoscopy with CO2 laser incision, steroid injection, and dilation, for subglottic stenosis  1/19/23, 4/13/23: Flexible fiberoptic laryngoscopy with steroid injection    At the last visit we resumed inhaled steroids.    Today's updates:  - Breathing is feeling a bit better compared to last time  - Peak flows are back up to around 450  - Voice, swallowing are fine  - no new PMH/PSH; saw rheumatologist last week and did not require any med changes  - getting over a recent URI          MEDICATIONS:     Current Outpatient Medications   Medication Sig Dispense Refill    ascorbic acid 62.5 mg TABS quarter-tab 1,000 mg      Avacopan 10 MG CAPS 3 capsules 2 times daily      azaTHIOprine (IMURAN) 50 MG tablet Take 50 mg by mouth daily      azelastine-fluticasone (DYMISTA) 137-50 MCG/ACT nasal spray       budesonide (PULMICORT) 0.5 MG/2ML neb solution Take 2 mLs (0.5 mg) by nebulization as needed (as needed up to 4 times daily to help manage subglottic stenosis) 240 mL 4    Calcium Carb-Cholecalciferol (CALCIUM 1000 + D) 1000-20 MG-MCG TABS Take 1 tablet by mouth daily      cholecalciferol 125 MCG (5000 UT)  CAPS Take 1 capsule by mouth 2 times daily      cyclobenzaprine (FLEXERIL) 10 MG tablet Take 10 mg by mouth      doxylamine (UNISOM) 25 MG TABS tablet Take 12.5 mg by mouth      fluticasone (FLOVENT HFA) 220 MCG/ACT inhaler Inhale 2 puffs into the lungs 2 times daily 12 g 3    ibuprofen (ADVIL/MOTRIN) 200 MG tablet Take 200-400 mg by mouth      labetalol (NORMODYNE) 200 MG tablet Take 200 mg by mouth 2 times daily      naproxen (NAPROSYN) 500 MG tablet TAKE ONE TABLET BY MOUTH TWICE DAILY AS NEEDED      phentermine (ADIPEX-P) 37.5 MG capsule Take 37.5 mg by mouth      predniSONE (DELTASONE) 5 MG tablet Take 7.5 mg by mouth daily      Probiotic Product (PROBIOTIC DAILY PO)       riTUXimab (RITUXAN) 10 MG/ML injection       sodium chloride 0.9 % neb solution Take 5 mLs by nebulization as needed for other (as needed up to 4 times per day to manage subglottic stenosis) 600 mL 4    sulfamethoxazole-trimethoprim (BACTRIM) 400-80 MG tablet Take 1 tablet by mouth daily      vitamin C (ASCORBIC ACID) 500 MG tablet Take 500 mg by mouth daily      albuterol (PROAIR HFA/PROVENTIL HFA/VENTOLIN HFA) 108 (90 Base) MCG/ACT inhaler Inhale 1-2 puffs into the lungs         ALLERGIES:    Allergies   Allergen Reactions    Metformin Diarrhea and Nausea and Vomiting       NEW PMH/PSH: None    REVIEW OF SYSTEMS:  The patient completed a comprehensive 11 point review of systems (below), which was reviewed. Positives are as noted below.  Patient Supplied Answers to Review of Systems Noncontributory         PHYSICAL EXAM:  General: The patient was alert and conversant, and in no acute distress.    Oral cavity/oropharynx: No masses or lesions. Dentition unchanged since prior. Tongue mobility and palate elevation intact and symmetric.  Neck: No palpable cervical lymphadenopathy, no significant tenderness to palpation of the thyrohyoid space, which was narrow. No obvious thyroid abnormality.  Resp: Breathing comfortably, no stridor or  stertor.  Neuro: Symmetric facial function. Other cranial nerve function as documented above.  Psych: Normal affect, pleasant and cooperative.  Voice/speech: No significant dysphonia.      Procedure:   Flexible fiberoptic laryngoscopy  Indications: This procedure was warranted to evaluate the patient's laryngeal anatomy and function. Risks, benefits, and alternatives were discussed.  Description: After written informed consent was obtained, a time-out was performed to confirm patient identity, procedure, and procedure site. Topical 2% lidocaine and oxymetazoline were applied to the nasal cavities. I performed the endoscopy and no complications were apparent.  Performed by: Kelsey Valadez MD MPH  Findings: Normal nasopharynx. Normal base of tongue, valleculae, and epiglottis. The right true vocal fold demonstrated normal mobility. The left true vocal fold demonstrated normal mobility. The medial edges of the true vocal folds appeared smooth and straight. No focal mucosal lesions were observed on the true vocal folds. Glissade produced appropriate elongation. Mucosa of the false vocal folds, aryepiglottic folds, piriform sinuses, and posterior glottis unremarkable. Airway was patent with slightly improved subglottic stenosis.                                    IMPRESSION AND PLAN:   Deepthi Aj returns with a slight improvement in her airway caliber; she is feeling good and her GPA is well-controlled.    Plan:  - needs refill on steroid inhaler-- an rx was provided for generic  - plan RTC ~3 months or sooner as needed    I spent a total of 25 minutes on 1/22/2024 in chart review, review of tests, patient visit, documentation, care coordination, and/or discussion with other providers about the issues documented above, separate from any documented procedure(s).

## 2024-01-22 NOTE — LETTER
1/22/2024      RE: Deepthi Aj  [address] [facility] [address]       Dear Colleague:  Deepthi Aj recently returned for follow-up at the [facility]. My clinic note from our visit is enclosed below.  Speech recognition software may have been used in the documentation below; input is reviewed before signature to the best of my ability.     I appreciate the ongoing opportunity to participate in this patient's care.    Please feel free to contact me with any questions.      Sincerely yours,  Kelsey Valadez M.D., M.P.H.  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     Procedure history:  11/23/22, 5/25/23: Microdirect laryngoscopy with CO2 laser incision, steroid injection, and dilation, for subglottic stenosis  1/19/23, 4/13/23: Flexible fiberoptic laryngoscopy with steroid injection    At the last visit we resumed inhaled steroids.    Today's updates:  - Breathing is feeling a bit better compared to last time  - Peak flows are back up to around 450  - Voice, swallowing are fine  - no new PMH/PSH; saw rheumatologist last week and did not require any med changes  - getting over a recent URI          MEDICATIONS:     Current Outpatient Medications   Medication Sig Dispense Refill    ascorbic acid 62.5 mg TABS quarter-tab 1,000 mg      Avacopan 10 MG CAPS 3 capsules 2 times daily      azaTHIOprine (IMURAN) 50 MG tablet Take 50 mg by mouth daily      azelastine-fluticasone (DYMISTA) 137-50 MCG/ACT nasal spray       budesonide (PULMICORT) 0.5 MG/2ML neb solution Take 2 mLs (0.5 mg) by nebulization as needed (as needed up to 4 times daily to help manage subglottic stenosis) 240 mL 4    Calcium Carb-Cholecalciferol (CALCIUM 1000 + D)  1000-20 MG-MCG TABS Take 1 tablet by mouth daily      cholecalciferol 125 MCG (5000 UT) CAPS Take 1 capsule by mouth 2 times daily      cyclobenzaprine (FLEXERIL) 10 MG tablet Take 10 mg by mouth      doxylamine (UNISOM) 25 MG TABS tablet Take 12.5 mg by mouth      fluticasone (FLOVENT HFA) 220 MCG/ACT inhaler Inhale 2 puffs into the lungs 2 times daily 12 g 3    ibuprofen (ADVIL/MOTRIN) 200 MG tablet Take 200-400 mg by mouth      labetalol (NORMODYNE) 200 MG tablet Take 200 mg by mouth 2 times daily      naproxen (NAPROSYN) 500 MG tablet TAKE ONE TABLET BY MOUTH TWICE DAILY AS NEEDED      phentermine (ADIPEX-P) 37.5 MG capsule Take 37.5 mg by mouth      predniSONE (DELTASONE) 5 MG tablet Take 7.5 mg by mouth daily      Probiotic Product (PROBIOTIC DAILY PO)       riTUXimab (RITUXAN) 10 MG/ML injection       sodium chloride 0.9 % neb solution Take 5 mLs by nebulization as needed for other (as needed up to 4 times per day to manage subglottic stenosis) 600 mL 4    sulfamethoxazole-trimethoprim (BACTRIM) 400-80 MG tablet Take 1 tablet by mouth daily      vitamin C (ASCORBIC ACID) 500 MG tablet Take 500 mg by mouth daily      albuterol (PROAIR HFA/PROVENTIL HFA/VENTOLIN HFA) 108 (90 Base) MCG/ACT inhaler Inhale 1-2 puffs into the lungs         ALLERGIES:    Allergies   Allergen Reactions    Metformin Diarrhea and Nausea and Vomiting       NEW PMH/PSH: None    REVIEW OF SYSTEMS:  The patient completed a comprehensive 11 point review of systems (below), which was reviewed. Positives are as noted below.  Patient Supplied Answers to Review of Systems Noncontributory         PHYSICAL EXAM:  General: The patient was alert and conversant, and in no acute distress.    Oral cavity/oropharynx: No masses or lesions. Dentition unchanged since prior. Tongue mobility and palate elevation intact and symmetric.  Neck: No palpable cervical lymphadenopathy, no significant tenderness to palpation of the thyrohyoid space, which was  narrow. No obvious thyroid abnormality.  Resp: Breathing comfortably, no stridor or stertor.  Neuro: Symmetric facial function. Other cranial nerve function as documented above.  Psych: Normal affect, pleasant and cooperative.  Voice/speech: No significant dysphonia.      Procedure:   Flexible fiberoptic laryngoscopy  Indications: This procedure was warranted to evaluate the patient's laryngeal anatomy and function. Risks, benefits, and alternatives were discussed.  Description: After written informed consent was obtained, a time-out was performed to confirm patient identity, procedure, and procedure site. Topical 2% lidocaine and oxymetazoline were applied to the nasal cavities. I performed the endoscopy and no complications were apparent.  Performed by: Kelsey Valadez MD MPH  Findings: Normal nasopharynx. Normal base of tongue, valleculae, and epiglottis. The right true vocal fold demonstrated normal mobility. The left true vocal fold demonstrated normal mobility. The medial edges of the true vocal folds appeared smooth and straight. No focal mucosal lesions were observed on the true vocal folds. Glissade produced appropriate elongation. Mucosa of the false vocal folds, aryepiglottic folds, piriform sinuses, and posterior glottis unremarkable. Airway was patent with slightly improved subglottic stenosis.                                    IMPRESSION AND PLAN:   Deepthi Aj returns with a slight improvement in her airway caliber; she is feeling good and her GPA is well-controlled.    Plan:  - needs refill on steroid inhaler-- an rx was provided for generic  - plan RTC ~3 months or sooner as needed    I spent a total of 25 minutes on 1/22/2024 in chart review, review of tests, patient visit, documentation, care coordination, and/or discussion with other providers about the issues documented above, separate from any documented procedure(s).      Kelsey Valadez MD

## 2024-01-22 NOTE — PATIENT INSTRUCTIONS
1.  You were seen in the ENT Clinic today by Dr. Valadez. If you have any questions or concerns after your appointment, please call [phone]. Press option #1 for scheduling related needs. Press option #3 for Nurse advice.    2.  Dr. Valadez has recommended the following:   - Will sent Flovent equivalent to pharmacy     3.  Plan is to return to clinic 3 months      Dana Barron RN  [phone]  [facility] - Otolaryngology

## 2024-02-06 DIAGNOSIS — J38.6 SUBGLOTTIC STENOSIS: ICD-10-CM

## 2024-03-10 ENCOUNTER — HEALTH MAINTENANCE LETTER (OUTPATIENT)
Age: 34
End: 2024-03-10

## 2024-04-05 ENCOUNTER — OFFICE VISIT (OUTPATIENT)
Dept: OTOLARYNGOLOGY | Facility: CLINIC | Age: 34
End: 2024-04-05
Payer: COMMERCIAL

## 2024-04-05 VITALS
WEIGHT: 293 LBS | HEART RATE: 92 BPM | HEIGHT: 71 IN | DIASTOLIC BLOOD PRESSURE: 90 MMHG | SYSTOLIC BLOOD PRESSURE: 130 MMHG | BODY MASS INDEX: 41.02 KG/M2

## 2024-04-05 DIAGNOSIS — M31.30 GRANULOMATOSIS WITH POLYANGIITIS WITHOUT RENAL INVOLVEMENT (H): ICD-10-CM

## 2024-04-05 DIAGNOSIS — J38.6 SUBGLOTTIC STENOSIS: Primary | ICD-10-CM

## 2024-04-05 PROCEDURE — 99212 OFFICE O/P EST SF 10 MIN: CPT | Mod: 25 | Performed by: OTOLARYNGOLOGY

## 2024-04-05 PROCEDURE — 31622 DX BRONCHOSCOPE/WASH: CPT | Performed by: OTOLARYNGOLOGY

## 2024-04-05 ASSESSMENT — PAIN SCALES - GENERAL: PAINLEVEL: NO PAIN (0)

## 2024-04-05 NOTE — PROGRESS NOTES
Dear Colleague:  Deepthi Aj recently returned for follow-up at the [facility]. My clinic note from our visit is enclosed below.  Speech recognition software may have been used in the documentation below; input is reviewed before signature to the best of my ability.     I appreciate the ongoing opportunity to participate in this patient's care.    Please feel free to contact me with any questions.      Sincerely yours,  Kelsey Valadez M.D., M.P.H.  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     Procedure history:  11/23/22, 5/25/23: Microdirect laryngoscopy with CO2 laser incision, steroid injection, and dilation, for subglottic stenosis  1/19/23, 4/13/23: Flexible fiberoptic laryngoscopy with steroid injection      Today's updates:  - breathing well  - peak flows ~500  - in Feb got CPAP; has not been life-changing, but is tolerating it  - voice, swallowing stable  - no new PMH/PSH  - next rheum followup later this month  - seeing pulmonology next week      MEDICATIONS:     Current Outpatient Medications   Medication Sig Dispense Refill    ascorbic acid 62.5 mg TABS quarter-tab 1,000 mg      Avacopan 10 MG CAPS 3 capsules 2 times daily      azaTHIOprine (IMURAN) 50 MG tablet Take 50 mg by mouth daily      azelastine-fluticasone (DYMISTA) 137-50 MCG/ACT nasal spray       beclomethasone HFA (QVAR REDIHALER) 80 MCG/ACT inhaler Inhale 1 puff into the lungs 2 times daily 10.6 g 11    budesonide (PULMICORT) 0.5 MG/2ML neb solution Take 2 mLs (0.5 mg) by nebulization as needed (as needed up to 4 times daily to help manage subglottic stenosis) 240 mL 4    Calcium Carb-Cholecalciferol (CALCIUM 1000 + D) 1000-20 MG-MCG TABS Take 1 tablet by mouth daily       cholecalciferol 125 MCG (5000 UT) CAPS Take 1 capsule by mouth 2 times daily      cyclobenzaprine (FLEXERIL) 10 MG tablet Take 10 mg by mouth      doxylamine (UNISOM) 25 MG TABS tablet Take 12.5 mg by mouth      fluticasone (FLOVENT HFA) 220 MCG/ACT inhaler Inhale 2 puffs into the lungs 2 times daily 12 g 3    ibuprofen (ADVIL/MOTRIN) 200 MG tablet Take 200-400 mg by mouth      labetalol (NORMODYNE) 200 MG tablet Take 200 mg by mouth 2 times daily      naproxen (NAPROSYN) 500 MG tablet TAKE ONE TABLET BY MOUTH TWICE DAILY AS NEEDED      phentermine (ADIPEX-P) 37.5 MG capsule Take 37.5 mg by mouth      predniSONE (DELTASONE) 5 MG tablet Take 7.5 mg by mouth daily      Probiotic Product (PROBIOTIC DAILY PO)       riTUXimab (RITUXAN) 10 MG/ML injection       sodium chloride 0.9 % neb solution Take 5 mLs by nebulization as needed for other (as needed up to 4 times per day to manage subglottic stenosis) 600 mL 4    sulfamethoxazole-trimethoprim (BACTRIM) 400-80 MG tablet Take 1 tablet by mouth daily      vitamin C (ASCORBIC ACID) 500 MG tablet Take 500 mg by mouth daily         ALLERGIES:    Allergies   Allergen Reactions    Metformin Diarrhea and Nausea and Vomiting       NEW PMH/PSH: None    REVIEW OF SYSTEMS:  The patient completed a comprehensive 11 point review of systems (below), which was reviewed. Positives are as noted below.  Patient Supplied Answers to Review of Systems Noncontributory      PHYSICAL EXAM:  General: The patient was alert and conversant, and in no acute distress.    Oral cavity/oropharynx: No masses or lesions. Dentition unchanged since prior. Tongue mobility and palate elevation intact and symmetric.  Neck: No palpable cervical lymphadenopathy, no significant tenderness to palpation of the thyrohyoid space, which was narrow. No obvious thyroid abnormality.  Resp: Breathing comfortably, no stridor or stertor.  Neuro: Symmetric facial function. Other cranial nerve function as documented  above.  Psych: Normal affect, pleasant and cooperative.  Voice/speech: No significant dysphonia.        Procedure:   Flexible Bronchoscopy (88158)  Indications: This procedure was warranted to evaluate the patient's airway anatomy. Risks, benefits, and alternatives were discussed.  Description: After informed consent was obtained, a time-out was performed to confirm patient identity, procedure, and procedure site. Topical 3% lidocaine with 0.25% phenylephrine was applied to the nasal cavities and oropharynx. 3 ml of 2% lidocaine was delivered via catheter through the channel of the endoscope and the patient was instructed to cough and expectorate into the suction. I performed the endoscopy and no complications were apparent.  Performed by: Kelsey Valadez MD MPH  Findings: Glottis patent with good bilateral vocal fold mobility. Subglottis with mild to moderate stenosis, slightly worse than prior. Trachea clear distally. Kim sharp. Unremarkable takeoffs of mainstem bronchi.                                IMPRESSION AND PLAN:   Deepthi Aj returns with very subtle worsening of the stenosis, but is fucntionally doing very well. Could consider injection under local anesthesia, but they are difficutl to tolerate and do not seem to make much of a difference for her so she prefers to hold off. Will plan ongoing surveillance. She is knowledgeable about her symptoms and comfortable reaching out with concerns.    Plan RTC 2-3 months or sooner as needed.    I spent a total of 15 minutes on 4/5/2024 in chart review, review of tests, patient visit, documentation, care coordination, and/or discussion with other providers about the issues documented above, separate from any documented procedure(s).

## 2024-04-05 NOTE — LETTER
4/5/2024      RE: Deepthi Aj  [address] [facility] [address]       Dear Colleague:  Deepthi Aj recently returned for follow-up at the [facility]. My clinic note from our visit is enclosed below.  Speech recognition software may have been used in the documentation below; input is reviewed before signature to the best of my ability.     I appreciate the ongoing opportunity to participate in this patient's care.    Please feel free to contact me with any questions.      Sincerely yours,  Kelsey Valadez M.D., M.P.H.  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     Procedure history:  11/23/22, 5/25/23: Microdirect laryngoscopy with CO2 laser incision, steroid injection, and dilation, for subglottic stenosis  1/19/23, 4/13/23: Flexible fiberoptic laryngoscopy with steroid injection      Today's updates:  - breathing well  - peak flows ~500  - in Feb got CPAP; has not been life-changing, but is tolerating it  - voice, swallowing stable  - no new PMH/PSH  - next rheum followup later this month  - seeing pulmonology next week      MEDICATIONS:     Current Outpatient Medications   Medication Sig Dispense Refill    ascorbic acid 62.5 mg TABS quarter-tab 1,000 mg      Avacopan 10 MG CAPS 3 capsules 2 times daily      azaTHIOprine (IMURAN) 50 MG tablet Take 50 mg by mouth daily      azelastine-fluticasone (DYMISTA) 137-50 MCG/ACT nasal spray       beclomethasone HFA (QVAR REDIHALER) 80 MCG/ACT inhaler Inhale 1 puff into the lungs 2 times daily 10.6 g 11    budesonide (PULMICORT) 0.5 MG/2ML neb solution Take 2 mLs (0.5 mg) by nebulization as needed (as needed up to 4 times daily to help manage subglottic stenosis) 240 mL 4    Calcium  Carb-Cholecalciferol (CALCIUM 1000 + D) 1000-20 MG-MCG TABS Take 1 tablet by mouth daily      cholecalciferol 125 MCG (5000 UT) CAPS Take 1 capsule by mouth 2 times daily      cyclobenzaprine (FLEXERIL) 10 MG tablet Take 10 mg by mouth      doxylamine (UNISOM) 25 MG TABS tablet Take 12.5 mg by mouth      fluticasone (FLOVENT HFA) 220 MCG/ACT inhaler Inhale 2 puffs into the lungs 2 times daily 12 g 3    ibuprofen (ADVIL/MOTRIN) 200 MG tablet Take 200-400 mg by mouth      labetalol (NORMODYNE) 200 MG tablet Take 200 mg by mouth 2 times daily      naproxen (NAPROSYN) 500 MG tablet TAKE ONE TABLET BY MOUTH TWICE DAILY AS NEEDED      phentermine (ADIPEX-P) 37.5 MG capsule Take 37.5 mg by mouth      predniSONE (DELTASONE) 5 MG tablet Take 7.5 mg by mouth daily      Probiotic Product (PROBIOTIC DAILY PO)       riTUXimab (RITUXAN) 10 MG/ML injection       sodium chloride 0.9 % neb solution Take 5 mLs by nebulization as needed for other (as needed up to 4 times per day to manage subglottic stenosis) 600 mL 4    sulfamethoxazole-trimethoprim (BACTRIM) 400-80 MG tablet Take 1 tablet by mouth daily      vitamin C (ASCORBIC ACID) 500 MG tablet Take 500 mg by mouth daily         ALLERGIES:    Allergies   Allergen Reactions    Metformin Diarrhea and Nausea and Vomiting       NEW PMH/PSH: None    REVIEW OF SYSTEMS:  The patient completed a comprehensive 11 point review of systems (below), which was reviewed. Positives are as noted below.  Patient Supplied Answers to Review of Systems Noncontributory      PHYSICAL EXAM:  General: The patient was alert and conversant, and in no acute distress.    Oral cavity/oropharynx: No masses or lesions. Dentition unchanged since prior. Tongue mobility and palate elevation intact and symmetric.  Neck: No palpable cervical lymphadenopathy, no significant tenderness to palpation of the thyrohyoid space, which was narrow. No obvious thyroid abnormality.  Resp: Breathing comfortably, no stridor or  stertor.  Neuro: Symmetric facial function. Other cranial nerve function as documented above.  Psych: Normal affect, pleasant and cooperative.  Voice/speech: No significant dysphonia.        Procedure:   Flexible Bronchoscopy (79797)  Indications: This procedure was warranted to evaluate the patient's airway anatomy. Risks, benefits, and alternatives were discussed.  Description: After informed consent was obtained, a time-out was performed to confirm patient identity, procedure, and procedure site. Topical 3% lidocaine with 0.25% phenylephrine was applied to the nasal cavities and oropharynx. 3 ml of 2% lidocaine was delivered via catheter through the channel of the endoscope and the patient was instructed to cough and expectorate into the suction. I performed the endoscopy and no complications were apparent.  Performed by: Kelsey Valadez MD MPH  Findings: Glottis patent with good bilateral vocal fold mobility. Subglottis with mild to moderate stenosis, slightly worse than prior. Trachea clear distally. Kim sharp. Unremarkable takeoffs of mainstem bronchi.                                IMPRESSION AND PLAN:   Deepthi Aj returns with very subtle worsening of the stenosis, but is fucntionally doing very well. Could consider injection under local anesthesia, but they are difficutl to tolerate and do not seem to make much of a difference for her so she prefers to hold off. Will plan ongoing surveillance. She is knowledgeable about her symptoms and comfortable reaching out with concerns.    Plan RTC 2-3 months or sooner as needed.    I spent a total of 15 minutes on 4/5/2024 in chart review, review of tests, patient visit, documentation, care coordination, and/or discussion with other providers about the issues documented above, separate from any documented procedure(s).      Kelsey Valadez MD

## 2024-04-30 ENCOUNTER — TELEPHONE (OUTPATIENT)
Dept: OTOLARYNGOLOGY | Facility: CLINIC | Age: 34
End: 2024-04-30
Payer: COMMERCIAL

## 2024-05-08 ENCOUNTER — TELEPHONE (OUTPATIENT)
Dept: OTOLARYNGOLOGY | Facility: CLINIC | Age: 34
End: 2024-05-08
Payer: COMMERCIAL

## 2024-05-09 ENCOUNTER — TELEPHONE (OUTPATIENT)
Dept: OTOLARYNGOLOGY | Facility: CLINIC | Age: 34
End: 2024-05-09
Payer: COMMERCIAL

## 2024-08-02 ENCOUNTER — OFFICE VISIT (OUTPATIENT)
Dept: OTOLARYNGOLOGY | Facility: CLINIC | Age: 34
End: 2024-08-02
Payer: COMMERCIAL

## 2024-08-02 VITALS
SYSTOLIC BLOOD PRESSURE: 147 MMHG | HEART RATE: 90 BPM | DIASTOLIC BLOOD PRESSURE: 92 MMHG | HEIGHT: 71 IN | WEIGHT: 293 LBS | BODY MASS INDEX: 41.02 KG/M2

## 2024-08-02 DIAGNOSIS — R13.10 DYSPHAGIA, UNSPECIFIED TYPE: ICD-10-CM

## 2024-08-02 DIAGNOSIS — J38.6 SUBGLOTTIC STENOSIS: Primary | ICD-10-CM

## 2024-08-02 PROCEDURE — 31622 DX BRONCHOSCOPE/WASH: CPT | Performed by: OTOLARYNGOLOGY

## 2024-08-02 PROCEDURE — 99214 OFFICE O/P EST MOD 30 MIN: CPT | Mod: 25 | Performed by: OTOLARYNGOLOGY

## 2024-08-02 RX ORDER — ALBUTEROL SULFATE 0.83 MG/ML
2.5 SOLUTION RESPIRATORY (INHALATION)
COMMUNITY
Start: 2022-10-19

## 2024-08-02 RX ORDER — VALACYCLOVIR HYDROCHLORIDE 500 MG/1
500 TABLET, FILM COATED ORAL
COMMUNITY
Start: 2024-07-22

## 2024-08-02 NOTE — PATIENT INSTRUCTIONS
1.  You were seen in the ENT Clinic today by . If you have any questions or concerns after your appointment, please call [phone]. Press option #1 for scheduling related needs. Press option #3 for Nurse advice.    2.   has recommended the following:   - schedule MDL procedure. Schedulers will reach out to schedule   - GI referral placed to address swallowing issues further. They will contact you for scheduling    3.  Plan is to return to clinic for post operative follow up. TBD.      Lala Tang LPN  [phone]  [facility] - Otolaryngology

## 2024-08-02 NOTE — PROGRESS NOTES
Dear Colleague:  Deepthi Aj recently returned for follow-up at the [facility]. My clinic note from our visit is enclosed below.  Speech recognition software may have been used in the documentation below; input is reviewed before signature to the best of my ability.     I appreciate the ongoing opportunity to participate in this patient's care.    Please feel free to contact me with any questions.      Sincerely yours,  Kelsey Valadez M.D., M.P.H.  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     Procedure history:  11/23/22, 5/25/23: Microdirect laryngoscopy with CO2 laser incision, steroid injection, and dilation, for subglottic stenosis  1/19/23, 4/13/23: Flexible fiberoptic laryngoscopy with steroid injection    Today's updates:  - breathing is a little worse, a bit wheezy with extended walking  - peak flows are ~425  - wondering if it's time for another surgery  - voice: stable  - swallow: has had an issue with dense solids getting stuck sometimes, knows how to avoid and manage it but has been worse lately  - no new PMH/PSH      MEDICATIONS:     Current Outpatient Medications   Medication Sig Dispense Refill    albuterol (PROVENTIL) (2.5 MG/3ML) 0.083% neb solution Inhale 2.5 mg into the lungs      ascorbic acid 62.5 mg TABS quarter-tab 1,000 mg      Avacopan 10 MG CAPS 3 capsules 2 times daily      azaTHIOprine (IMURAN) 50 MG tablet Take 50 mg by mouth daily      azelastine-fluticasone (DYMISTA) 137-50 MCG/ACT nasal spray       beclomethasone HFA (QVAR REDIHALER) 80 MCG/ACT inhaler Inhale 1 puff into the lungs 2 times daily 10.6 g 11    budesonide (PULMICORT) 0.5 MG/2ML neb solution Take 2 mLs (0.5 mg) by nebulization as needed (as needed  up to 4 times daily to help manage subglottic stenosis) 240 mL 4    Calcium Carb-Cholecalciferol (CALCIUM 1000 + D) 1000-20 MG-MCG TABS Take 1 tablet by mouth daily      cholecalciferol 125 MCG (5000 UT) CAPS Take 1 capsule by mouth 2 times daily      cyclobenzaprine (FLEXERIL) 10 MG tablet Take 10 mg by mouth      doxylamine (UNISOM) 25 MG TABS tablet Take 12.5 mg by mouth      fluticasone (FLOVENT HFA) 220 MCG/ACT inhaler Inhale 2 puffs into the lungs 2 times daily 12 g 3    ibuprofen (ADVIL/MOTRIN) 200 MG tablet Take 200-400 mg by mouth      labetalol (NORMODYNE) 200 MG tablet Take 200 mg by mouth 2 times daily      naproxen (NAPROSYN) 500 MG tablet TAKE ONE TABLET BY MOUTH TWICE DAILY AS NEEDED      phentermine (ADIPEX-P) 37.5 MG capsule Take 37.5 mg by mouth      predniSONE (DELTASONE) 5 MG tablet Take 7.5 mg by mouth daily      Probiotic Product (PROBIOTIC DAILY PO)       riTUXimab (RITUXAN) 10 MG/ML injection       sodium chloride 0.9 % neb solution Take 5 mLs by nebulization as needed for other (as needed up to 4 times per day to manage subglottic stenosis) 600 mL 4    sulfamethoxazole-trimethoprim (BACTRIM) 400-80 MG tablet Take 1 tablet by mouth daily      valACYclovir (VALTREX) 500 MG tablet Take 500 mg by mouth      vitamin C (ASCORBIC ACID) 500 MG tablet Take 500 mg by mouth daily         ALLERGIES:    Allergies   Allergen Reactions    Metformin Diarrhea and Nausea and Vomiting       NEW PMH/PSH: None    REVIEW OF SYSTEMS:  The patient completed a comprehensive 11 point review of systems (below), which was reviewed. Positives are as noted below.  Patient Supplied Answers to Review of Systems Noncontributory       PHYSICAL EXAM:  General: The patient was alert and conversant, and in no acute distress.    Oral cavity/oropharynx: No masses or lesions. Dentition unchanged since prior. Tongue mobility and palate elevation intact and symmetric.  Neck: No palpable cervical lymphadenopathy, no significant  tenderness to palpation of the thyrohyoid space, which was narrow. No obvious thyroid abnormality.  Resp: Breathing comfortably, no stridor or stertor. Obvious noise with rapid inhale.  Neuro: Symmetric facial function. Other cranial nerve function as documented above.  Psych: Normal affect, pleasant and cooperative.  Voice/speech: No significant dysphonia.    Procedure:   Flexible Bronchoscopy (08194)  Indications: This procedure was warranted to evaluate the patient's airway anatomy. Risks, benefits, and alternatives were discussed.  Description: After informed consent was obtained, a time-out was performed to confirm patient identity, procedure, and procedure site. Topical 3% lidocaine with 0.25% phenylephrine was applied to the nasal cavities and oropharynx. I performed the endoscopy and no complications were apparent.  Performed by: Kelsey Valadez MD MPH  Findings: Glottis patent with good bilateral vocal fold mobility. Subglottis with moderate stenosis, no granulation. Trachea clear distally. Kim sharp. Unremarkable takeoffs of mainstem bronchi.                           IMPRESSION AND PLAN:   Deepthi Aj returns with progression of the airway stenosis. She is also noting worse dysphagia.    Plan:  1) discussed options including local injection of steroids vs repeat MDL; she strongly prefers MDL. A case request was placed.  2) GI referral for solid dysphagia, sensation of food sticking in chest.    I spent a total of 31 minutes on 8/2/2024 in chart review, review of tests, patient visit, documentation, care coordination, and/or discussion with other providers about the issues documented above, separate from any documented procedure(s).

## 2024-08-02 NOTE — LETTER
8/2/2024      RE: Deepthi Aj  [address] [facility] [address]       Dear Colleague:  Deepthi Aj recently returned for follow-up at the [facility]. My clinic note from our visit is enclosed below.  Speech recognition software may have been used in the documentation below; input is reviewed before signature to the best of my ability.     I appreciate the ongoing opportunity to participate in this patient's care.    Please feel free to contact me with any questions.      Sincerely yours,  Kelsey Valadez M.D., M.P.H.  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     Procedure history:  11/23/22, 5/25/23: Microdirect laryngoscopy with CO2 laser incision, steroid injection, and dilation, for subglottic stenosis  1/19/23, 4/13/23: Flexible fiberoptic laryngoscopy with steroid injection    Today's updates:  - breathing is a little worse, a bit wheezy with extended walking  - peak flows are ~425  - wondering if it's time for another surgery  - voice: stable  - swallow: has had an issue with dense solids getting stuck sometimes, knows how to avoid and manage it but has been worse lately  - no new PMH/PSH      MEDICATIONS:     Current Outpatient Medications   Medication Sig Dispense Refill     albuterol (PROVENTIL) (2.5 MG/3ML) 0.083% neb solution Inhale 2.5 mg into the lungs       ascorbic acid 62.5 mg TABS quarter-tab 1,000 mg       Avacopan 10 MG CAPS 3 capsules 2 times daily       azaTHIOprine (IMURAN) 50 MG tablet Take 50 mg by mouth daily       azelastine-fluticasone (DYMISTA) 137-50 MCG/ACT nasal spray        beclomethasone HFA (QVAR REDIHALER) 80 MCG/ACT inhaler Inhale 1 puff into the lungs 2 times daily 10.6 g 11     budesonide  Patient states she or he has 0% of improvement & 7 pain level  Patient states he had his MRI done 2 days ago & would like his results called into him   Please advise, alonso    Call back# [phone] (PULMICORT) 0.5 MG/2ML neb solution Take 2 mLs (0.5 mg) by nebulization as needed (as needed up to 4 times daily to help manage subglottic stenosis) 240 mL 4     Calcium Carb-Cholecalciferol (CALCIUM 1000 + D) 1000-20 MG-MCG TABS Take 1 tablet by mouth daily       cholecalciferol 125 MCG (5000 UT) CAPS Take 1 capsule by mouth 2 times daily       cyclobenzaprine (FLEXERIL) 10 MG tablet Take 10 mg by mouth       doxylamine (UNISOM) 25 MG TABS tablet Take 12.5 mg by mouth       fluticasone (FLOVENT HFA) 220 MCG/ACT inhaler Inhale 2 puffs into the lungs 2 times daily 12 g 3     ibuprofen (ADVIL/MOTRIN) 200 MG tablet Take 200-400 mg by mouth       labetalol (NORMODYNE) 200 MG tablet Take 200 mg by mouth 2 times daily       naproxen (NAPROSYN) 500 MG tablet TAKE ONE TABLET BY MOUTH TWICE DAILY AS NEEDED       phentermine (ADIPEX-P) 37.5 MG capsule Take 37.5 mg by mouth       predniSONE (DELTASONE) 5 MG tablet Take 7.5 mg by mouth daily       Probiotic Product (PROBIOTIC DAILY PO)        riTUXimab (RITUXAN) 10 MG/ML injection        sodium chloride 0.9 % neb solution Take 5 mLs by nebulization as needed for other (as needed up to 4 times per day to manage subglottic stenosis) 600 mL 4     sulfamethoxazole-trimethoprim (BACTRIM) 400-80 MG tablet Take 1 tablet by mouth daily       valACYclovir (VALTREX) 500 MG tablet Take 500 mg by mouth       vitamin C (ASCORBIC ACID) 500 MG tablet Take 500 mg by mouth daily         ALLERGIES:    Allergies   Allergen Reactions     Metformin Diarrhea and Nausea and Vomiting       NEW PMH/PSH: None    REVIEW OF SYSTEMS:  The patient completed a comprehensive 11 point review of systems (below), which was reviewed. Positives are as noted below.  Patient Supplied Answers to Review of Systems Noncontributory       PHYSICAL EXAM:  General: The patient was alert and conversant, and in no acute distress.    Oral cavity/oropharynx: No masses or lesions. Dentition unchanged since prior. Tongue  mobility and palate elevation intact and symmetric.  Neck: No palpable cervical lymphadenopathy, no significant tenderness to palpation of the thyrohyoid space, which was narrow. No obvious thyroid abnormality.  Resp: Breathing comfortably, no stridor or stertor. Obvious noise with rapid inhale.  Neuro: Symmetric facial function. Other cranial nerve function as documented above.  Psych: Normal affect, pleasant and cooperative.  Voice/speech: No significant dysphonia.    Procedure:   Flexible Bronchoscopy (53664)  Indications: This procedure was warranted to evaluate the patient's airway anatomy. Risks, benefits, and alternatives were discussed.  Description: After informed consent was obtained, a time-out was performed to confirm patient identity, procedure, and procedure site. Topical 3% lidocaine with 0.25% phenylephrine was applied to the nasal cavities and oropharynx. I performed the endoscopy and no complications were apparent.  Performed by: Kelsey Valadez MD [facility]  Findings: Glottis patent with good bilateral vocal fold mobility. Subglottis with moderate stenosis, no granulation. Trachea clear distally. Kim sharp. Unremarkable takeoffs of mainstem bronchi.                           IMPRESSION AND PLAN:   Deepthi Aj returns with progression of the airway stenosis. She is also noting worse dysphagia.    Plan:  1) discussed options including local injection of steroids vs repeat MDL; she strongly prefers MDL. A case request was placed.  2) GI referral for solid dysphagia, sensation of food sticking in chest.    I spent a total of 31 minutes on 8/2/2024 in chart review, review of tests, patient visit, documentation, care coordination, and/or discussion with other providers about the issues documented above, separate from any documented procedure(s).      Kelsey Valadez MD

## 2024-08-06 ENCOUNTER — TELEPHONE (OUTPATIENT)
Dept: OTOLARYNGOLOGY | Facility: CLINIC | Age: 34
End: 2024-08-06
Payer: COMMERCIAL

## 2024-08-06 ENCOUNTER — TELEPHONE (OUTPATIENT)
Dept: GASTROENTEROLOGY | Facility: CLINIC | Age: 34
End: 2024-08-06
Payer: COMMERCIAL

## 2024-08-06 NOTE — TELEPHONE ENCOUNTER
Scheduled surgery with Dr. Valadez on 8/29/2024    Spoke with: Patient    Surgery is located at [facility]    Patient will be seen for their H&P by their PCP Katelyn Packer NP within 30 days of surgery - Confirmed PCP on file is up to date     Does patient need a consult before upcoming surgery? No    Anesthesia type: General    Requested Imaging required for surgery: No    Patient needs scheduled for their 3 week post op    Patient will receive their surgery packet via [facility] per their preference    Patient was not provided a start time for surgery & is aware they will receive this information 2-3 days before surgery    Additional comments: Patient was instructed to call back with any further questions or concerns.     Dariana Coyne on 8/6/2024 at 1:53 PM

## 2024-08-06 NOTE — TELEPHONE ENCOUNTER
Writer received a message from provider to help get Pt scheduled for a new esophageal appointment, referral from ENT. Writer called and talked with the Pt. Pt declined an appointment with [facility] and mentioned that Pt will be going to Pt's PCP. Writer will send a message to update provider. Closing encounter.

## 2024-08-08 ENCOUNTER — PATIENT OUTREACH (OUTPATIENT)
Dept: OTOLARYNGOLOGY | Facility: CLINIC | Age: 34
End: 2024-08-08
Payer: COMMERCIAL

## 2024-08-08 NOTE — PROGRESS NOTES
Called patient regarding GI appointment. Patient stated that she is seeing a GI clinic through [facility] but is a GI specialty not her primary care provider. Writer will update care team and encouraged patient to reach out with any other questions or concerns in the meantime. Patient was agreeable and verbalized understanding of the situation. Dana Barron RN on 8/8/2024 at 8:55 AM

## 2024-08-22 ENCOUNTER — TRANSFERRED RECORDS (OUTPATIENT)
Dept: HEALTH INFORMATION MANAGEMENT | Facility: CLINIC | Age: 34
End: 2024-08-22
Payer: COMMERCIAL

## 2024-08-22 ENCOUNTER — PATIENT OUTREACH (OUTPATIENT)
Dept: OTOLARYNGOLOGY | Facility: CLINIC | Age: 34
End: 2024-08-22
Payer: COMMERCIAL

## 2024-08-22 NOTE — PROGRESS NOTES
Called PAN on behalf of patient regarding 7 month med hold. LVM and provided direct number for call back. Dana Barron RN on 8/22/2024 at 12:52 PM

## 2024-08-28 ENCOUNTER — ANESTHESIA EVENT (OUTPATIENT)
Dept: SURGERY | Facility: CLINIC | Age: 34
End: 2024-08-28
Payer: COMMERCIAL

## 2024-08-28 NOTE — ANESTHESIA PREPROCEDURE EVALUATION
Anesthesia Pre-Procedure Evaluation    Patient: Deepthi Aj   MRN: [id number] : 1990        Procedure : Procedure(s):  Microdirect laryngoscopy with incision possible dilation, possible mitomycin, possible CO2 laser, for subglottic stenosis  Inject steroid          Past Medical History:   Diagnosis Date     Difficult intubation      Granulomatosis with polyangiitis (H)      Subglottic stenosis      Uncomplicated asthma       Past Surgical History:   Procedure Laterality Date     INJECT STEROID (LOCATION) N/A 2022    Procedure: steroid injection;  Surgeon: Kelsey Valadez MD;  Location: [facility] OR     LARYNGOSCOPY, FLEXIBLE WITH INJECTION N/A 2023    Procedure: Transnasal flexible laryngoscopy with steroid injection for subglottic stenosis;  Surgeon: Kelsey Valadez MD;  Location: [facility] OR     LASER CO2 LARYNGOSCOPY N/A 2022    Procedure: Microdirect laryngoscopy with CO2 Laser incision, dilation, for subglottic stenosis;  Surgeon: Kelsey Valadez MD;  Location: [facility] OR     LASER CO2 LARYNGOSCOPY N/A 2023    Procedure: Microdirect laryngoscopy with incision, CRE balloon dilation, CO2 laser for subglottic stenosis, steroid injection;  Surgeon: Kelsey Valadez MD;  Location: [facility] OR      Allergies   Allergen Reactions     Metformin Diarrhea and Nausea and Vomiting      Social History     Tobacco Use     Smoking status: Never     Smokeless tobacco: Never   Substance Use Topics     Alcohol use: Yes      Wt Readings from Last 1 Encounters:   24 (!) 200.5 kg (442 lb)        Anesthesia Evaluation   Pt has had prior anesthetic. Type: General.    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 Now with slowly worsening breathing.    (+)     MCKENZIE risk factors,  hypertension, obese,              "Intermittent, asthma                  Neurologic:  - neg neurologic ROS     Cardiovascular:     (+)  hypertension- -   -  - -                                 Previous cardiac testing   Echo: Date: 2/16/2022 Results:  Left ventricular ejection fraction is visually estimated at 60%.   No significant valvular abnormalities were identified.   Pulmonary artery systolic pressure estimate is 27mmHg above RAP.   (Normal) .   Stress Test:  Date: Results:    ECG Reviewed:  Date: Results:    Cath:  Date: Results:      METS/Exercise Tolerance:     Hematologic:  - neg hematologic  ROS  (-) anemia   Musculoskeletal:  - neg musculoskeletal ROS     GI/Hepatic: Comment: Dysphagia with solid foods     (+) GERD, Asymptomatic on medication,                  Renal/Genitourinary:  - neg Renal ROS     Endo:  - neg endo ROS   (+)            Chronic steroid usage for   Obesity (BMI > 60),       Psychiatric/Substance Use:  - neg psychiatric ROS     Infectious Disease:       Malignancy:       Other:            Physical Exam    Airway        Mallampati: III   TM distance: > 3 FB   Neck ROM: full   Mouth opening: > 3 cm    Respiratory Devices and Support         Dental       (+) Minor Abnormalities - some fillings, tiny chips      Cardiovascular   cardiovascular exam normal       Rhythm and rate: regular and normal     Pulmonary           breath sounds clear to auscultation         OUTSIDE LABS:  CBC: No results found for: \"WBC\", \"HGB\", \"HCT\", \"PLT\"  BMP:   Lab Results   Component Value Date    GLC 94 11/23/2022     COAGS: No results found for: \"PTT\", \"INR\", \"FIBR\"  POC: No results found for: \"BGM\", \"HCG\", \"HCGS\"  HEPATIC: No results found for: \"ALBUMIN\", \"PROTTOTAL\", \"ALT\", \"AST\", \"GGT\", \"ALKPHOS\", \"BILITOTAL\", \"BILIDIRECT\", \"KELLY\"  OTHER: No results found for: \"PH\", \"LACT\", \"A1C\", \"EBONIE\", \"PHOS\", \"MAG\", \"LIPASE\", \"AMYLASE\", \"TSH\", \"T4\", \"T3\", \"CRP\", \"SED\"    Anesthesia Plan    ASA Status:  3    NPO Status:  NPO Appropriate    Anesthesia Type: " "General.     - Airway: ETT   Induction: Intravenous, Propofol.   Maintenance: TIVA.   Techniques and Equipment:     - Lines/Monitors: BIS     Consents    Anesthesia Plan(s) and associated risks, benefits, and realistic alternatives discussed. Questions answered and patient/representative(s) expressed understanding.     - Discussed:     - Discussed with:  Patient      - Extended Intubation/Ventilatory Support Discussed: No.      - Patient is DNR/DNI Status: No     Use of blood products discussed: No .     Postoperative Care    Pain management: Multi-modal analgesia, IV analgesics.   PONV prophylaxis: Dexamethasone or Solumedrol, Ondansetron (or other 5HT-3)     Comments:               Lina Anton MD    I have reviewed the pertinent notes and labs in the chart from the past 30 days and (re)examined the patient.  Any updates or changes from those notes are reflected in this note.              # Severe Obesity: Estimated body mass index is 61.65 kg/m  as calculated from the following:    Height as of 8/2/24: 1.803 m (5' 11\").    Weight as of 8/2/24: 200.5 kg (442 lb).      "

## 2024-08-29 ENCOUNTER — ANESTHESIA (OUTPATIENT)
Dept: SURGERY | Facility: CLINIC | Age: 34
End: 2024-08-29
Payer: COMMERCIAL

## 2024-08-29 ENCOUNTER — HOSPITAL ENCOUNTER (OUTPATIENT)
Facility: CLINIC | Age: 34
Discharge: HOME | End: 2024-08-29
Attending: OTOLARYNGOLOGY | Admitting: OTOLARYNGOLOGY
Payer: COMMERCIAL

## 2024-08-29 VITALS
SYSTOLIC BLOOD PRESSURE: 135 MMHG | HEIGHT: 71 IN | WEIGHT: 293 LBS | RESPIRATION RATE: 18 BRPM | OXYGEN SATURATION: 100 % | HEART RATE: 66 BPM | DIASTOLIC BLOOD PRESSURE: 69 MMHG | TEMPERATURE: 98 F | BODY MASS INDEX: 41.02 KG/M2

## 2024-08-29 PROCEDURE — 250N000011 HC RX IP 250 OP 636: Performed by: STUDENT IN AN ORGANIZED HEALTH CARE EDUCATION/TRAINING PROGRAM

## 2024-08-29 PROCEDURE — 250N000009 HC RX 250: Performed by: STUDENT IN AN ORGANIZED HEALTH CARE EDUCATION/TRAINING PROGRAM

## 2024-08-29 PROCEDURE — 370N000017 HC ANESTHESIA TECHNICAL FEE, PER MIN: Performed by: OTOLARYNGOLOGY

## 2024-08-29 PROCEDURE — 272N000001 HC OR GENERAL SUPPLY STERILE: Performed by: OTOLARYNGOLOGY

## 2024-08-29 PROCEDURE — 250N000013 HC RX MED GY IP 250 OP 250 PS 637: Performed by: STUDENT IN AN ORGANIZED HEALTH CARE EDUCATION/TRAINING PROGRAM

## 2024-08-29 PROCEDURE — 360N000077 HC SURGERY LEVEL 4, PER MIN: Performed by: OTOLARYNGOLOGY

## 2024-08-29 PROCEDURE — 999N000141 HC STATISTIC PRE-PROCEDURE NURSING ASSESSMENT: Performed by: OTOLARYNGOLOGY

## 2024-08-29 PROCEDURE — 710N000012 HC RECOVERY PHASE 2, PER MINUTE: Performed by: OTOLARYNGOLOGY

## 2024-08-29 PROCEDURE — 250N000011 HC RX IP 250 OP 636: Performed by: OTOLARYNGOLOGY

## 2024-08-29 PROCEDURE — 250N000025 HC SEVOFLURANE, PER MIN: Performed by: OTOLARYNGOLOGY

## 2024-08-29 PROCEDURE — 258N000003 HC RX IP 258 OP 636: Performed by: STUDENT IN AN ORGANIZED HEALTH CARE EDUCATION/TRAINING PROGRAM

## 2024-08-29 PROCEDURE — 31540 LARYNGOSCOPY W/EXC OF TUMOR: CPT | Performed by: ANESTHESIOLOGY

## 2024-08-29 PROCEDURE — 710N000010 HC RECOVERY PHASE 1, LEVEL 2, PER MIN: Performed by: OTOLARYNGOLOGY

## 2024-08-29 RX ORDER — ACETAMINOPHEN 325 MG/1
975 TABLET ORAL ONCE
Status: COMPLETED | OUTPATIENT
Start: 2024-08-29 | End: 2024-08-29

## 2024-08-29 RX ORDER — ONDANSETRON 2 MG/ML
4 INJECTION INTRAMUSCULAR; INTRAVENOUS EVERY 30 MIN PRN
Status: DISCONTINUED | OUTPATIENT
Start: 2024-08-29 | End: 2024-08-29 | Stop reason: HOSPADM

## 2024-08-29 RX ORDER — DEXAMETHASONE SODIUM PHOSPHATE 4 MG/ML
4 INJECTION, SOLUTION INTRA-ARTICULAR; INTRALESIONAL; INTRAMUSCULAR; INTRAVENOUS; SOFT TISSUE
Status: DISCONTINUED | OUTPATIENT
Start: 2024-08-29 | End: 2024-08-29 | Stop reason: HOSPADM

## 2024-08-29 RX ORDER — ONDANSETRON 4 MG/1
4 TABLET, ORALLY DISINTEGRATING ORAL EVERY 30 MIN PRN
Status: DISCONTINUED | OUTPATIENT
Start: 2024-08-29 | End: 2024-08-29 | Stop reason: HOSPADM

## 2024-08-29 RX ORDER — FENTANYL CITRATE 50 UG/ML
INJECTION, SOLUTION INTRAMUSCULAR; INTRAVENOUS PRN
Status: DISCONTINUED | OUTPATIENT
Start: 2024-08-29 | End: 2024-08-29

## 2024-08-29 RX ORDER — AMPICILLIN AND SULBACTAM 1; .5 G/1; G/1
1.5 INJECTION, POWDER, FOR SOLUTION INTRAMUSCULAR; INTRAVENOUS SEE ADMIN INSTRUCTIONS
Status: DISCONTINUED | OUTPATIENT
Start: 2024-08-29 | End: 2024-08-29 | Stop reason: HOSPADM

## 2024-08-29 RX ORDER — SODIUM CHLORIDE, SODIUM LACTATE, POTASSIUM CHLORIDE, CALCIUM CHLORIDE 600; 310; 30; 20 MG/100ML; MG/100ML; MG/100ML; MG/100ML
INJECTION, SOLUTION INTRAVENOUS CONTINUOUS
Status: DISCONTINUED | OUTPATIENT
Start: 2024-08-29 | End: 2024-08-29 | Stop reason: HOSPADM

## 2024-08-29 RX ORDER — EPINEPHRINE 0.1 MG/ML
INJECTION INTRAVENOUS PRN
Status: DISCONTINUED | OUTPATIENT
Start: 2024-08-29 | End: 2024-08-29 | Stop reason: HOSPADM

## 2024-08-29 RX ORDER — OXYCODONE HYDROCHLORIDE 5 MG/1
5 TABLET ORAL
Status: DISCONTINUED | OUTPATIENT
Start: 2024-08-29 | End: 2024-08-29 | Stop reason: HOSPADM

## 2024-08-29 RX ORDER — HYDROMORPHONE HCL IN WATER/PF 6 MG/30 ML
0.4 PATIENT CONTROLLED ANALGESIA SYRINGE INTRAVENOUS EVERY 5 MIN PRN
Status: DISCONTINUED | OUTPATIENT
Start: 2024-08-29 | End: 2024-08-29 | Stop reason: HOSPADM

## 2024-08-29 RX ORDER — PROPOFOL 10 MG/ML
INJECTION, EMULSION INTRAVENOUS PRN
Status: DISCONTINUED | OUTPATIENT
Start: 2024-08-29 | End: 2024-08-29

## 2024-08-29 RX ORDER — NALOXONE HYDROCHLORIDE 0.4 MG/ML
0.1 INJECTION, SOLUTION INTRAMUSCULAR; INTRAVENOUS; SUBCUTANEOUS
Status: DISCONTINUED | OUTPATIENT
Start: 2024-08-29 | End: 2024-08-29 | Stop reason: HOSPADM

## 2024-08-29 RX ORDER — DEXAMETHASONE SODIUM PHOSPHATE 4 MG/ML
INJECTION, SOLUTION INTRA-ARTICULAR; INTRALESIONAL; INTRAMUSCULAR; INTRAVENOUS; SOFT TISSUE PRN
Status: DISCONTINUED | OUTPATIENT
Start: 2024-08-29 | End: 2024-08-29

## 2024-08-29 RX ORDER — DEXAMETHASONE SODIUM PHOSPHATE 10 MG/ML
10 INJECTION, SOLUTION INTRAMUSCULAR; INTRAVENOUS ONCE
Status: DISCONTINUED | OUTPATIENT
Start: 2024-08-29 | End: 2024-08-29 | Stop reason: HOSPADM

## 2024-08-29 RX ORDER — ONDANSETRON 2 MG/ML
INJECTION INTRAMUSCULAR; INTRAVENOUS PRN
Status: DISCONTINUED | OUTPATIENT
Start: 2024-08-29 | End: 2024-08-29

## 2024-08-29 RX ORDER — HYDROMORPHONE HCL IN WATER/PF 6 MG/30 ML
0.2 PATIENT CONTROLLED ANALGESIA SYRINGE INTRAVENOUS EVERY 5 MIN PRN
Status: DISCONTINUED | OUTPATIENT
Start: 2024-08-29 | End: 2024-08-29 | Stop reason: HOSPADM

## 2024-08-29 RX ORDER — FENTANYL CITRATE 50 UG/ML
50 INJECTION, SOLUTION INTRAMUSCULAR; INTRAVENOUS EVERY 5 MIN PRN
Status: DISCONTINUED | OUTPATIENT
Start: 2024-08-29 | End: 2024-08-29 | Stop reason: HOSPADM

## 2024-08-29 RX ORDER — TRIAMCINOLONE ACETONIDE 40 MG/ML
INJECTION, SUSPENSION INTRA-ARTICULAR; INTRAMUSCULAR PRN
Status: DISCONTINUED | OUTPATIENT
Start: 2024-08-29 | End: 2024-08-29 | Stop reason: HOSPADM

## 2024-08-29 RX ORDER — AMPICILLIN AND SULBACTAM 2; 1 G/1; G/1
3 INJECTION, POWDER, FOR SOLUTION INTRAMUSCULAR; INTRAVENOUS
Status: DISCONTINUED | OUTPATIENT
Start: 2024-08-29 | End: 2024-08-29 | Stop reason: HOSPADM

## 2024-08-29 RX ORDER — FENTANYL CITRATE 50 UG/ML
25 INJECTION, SOLUTION INTRAMUSCULAR; INTRAVENOUS EVERY 5 MIN PRN
Status: DISCONTINUED | OUTPATIENT
Start: 2024-08-29 | End: 2024-08-29 | Stop reason: HOSPADM

## 2024-08-29 RX ORDER — SODIUM CHLORIDE, SODIUM LACTATE, POTASSIUM CHLORIDE, CALCIUM CHLORIDE 600; 310; 30; 20 MG/100ML; MG/100ML; MG/100ML; MG/100ML
INJECTION, SOLUTION INTRAVENOUS CONTINUOUS PRN
Status: DISCONTINUED | OUTPATIENT
Start: 2024-08-29 | End: 2024-08-29

## 2024-08-29 RX ORDER — OXYCODONE HYDROCHLORIDE 10 MG/1
10 TABLET ORAL
Status: DISCONTINUED | OUTPATIENT
Start: 2024-08-29 | End: 2024-08-29 | Stop reason: HOSPADM

## 2024-08-29 RX ORDER — PROPOFOL 10 MG/ML
INJECTION, EMULSION INTRAVENOUS CONTINUOUS PRN
Status: DISCONTINUED | OUTPATIENT
Start: 2024-08-29 | End: 2024-08-29

## 2024-08-29 RX ORDER — LIDOCAINE HYDROCHLORIDE 40 MG/ML
SOLUTION TOPICAL PRN
Status: DISCONTINUED | OUTPATIENT
Start: 2024-08-29 | End: 2024-08-29 | Stop reason: HOSPADM

## 2024-08-29 RX ORDER — LIDOCAINE 40 MG/G
CREAM TOPICAL
Status: DISCONTINUED | OUTPATIENT
Start: 2024-08-29 | End: 2024-08-29 | Stop reason: HOSPADM

## 2024-08-29 RX ADMIN — MIDAZOLAM 2 MG: 1 INJECTION INTRAMUSCULAR; INTRAVENOUS at 07:20

## 2024-08-29 RX ADMIN — Medication 50 MG: at 07:54

## 2024-08-29 RX ADMIN — Medication 20 MG: at 08:01

## 2024-08-29 RX ADMIN — DEXAMETHASONE SODIUM PHOSPHATE 10 MG: 4 INJECTION, SOLUTION INTRA-ARTICULAR; INTRALESIONAL; INTRAMUSCULAR; INTRAVENOUS; SOFT TISSUE at 07:54

## 2024-08-29 RX ADMIN — Medication 20 MG: at 08:21

## 2024-08-29 RX ADMIN — ONDANSETRON 4 MG: 2 INJECTION INTRAMUSCULAR; INTRAVENOUS at 08:17

## 2024-08-29 RX ADMIN — AMPICILLIN SODIUM AND SULBACTAM SODIUM 1.5 G: 1; .5 INJECTION, POWDER, FOR SOLUTION INTRAMUSCULAR; INTRAVENOUS at 07:55

## 2024-08-29 RX ADMIN — ACETAMINOPHEN 975 MG: 325 TABLET ORAL at 06:06

## 2024-08-29 RX ADMIN — PROPOFOL 150 MG: 10 INJECTION, EMULSION INTRAVENOUS at 07:38

## 2024-08-29 RX ADMIN — PROPOFOL 50 MG: 10 INJECTION, EMULSION INTRAVENOUS at 07:41

## 2024-08-29 RX ADMIN — Medication 600 MG: at 08:37

## 2024-08-29 RX ADMIN — FENTANYL CITRATE 100 MCG: 50 INJECTION INTRAMUSCULAR; INTRAVENOUS at 07:38

## 2024-08-29 RX ADMIN — SODIUM CHLORIDE, POTASSIUM CHLORIDE, SODIUM LACTATE AND CALCIUM CHLORIDE: 600; 310; 30; 20 INJECTION, SOLUTION INTRAVENOUS at 07:35

## 2024-08-29 RX ADMIN — PROPOFOL 200 MCG/KG/MIN: 10 INJECTION, EMULSION INTRAVENOUS at 07:38

## 2024-08-29 RX ADMIN — Medication 50 MG: at 07:45

## 2024-08-29 ASSESSMENT — ACTIVITIES OF DAILY LIVING (ADL)
ADLS_ACUITY_SCORE: 35
ADLS_ACUITY_SCORE: 36
ADLS_ACUITY_SCORE: 36

## 2024-08-29 NOTE — ANESTHESIA POSTPROCEDURE EVALUATION
Patient: Deepthi Aj    Procedure: Procedure(s):  Microdirect laryngoscopy with incision, CO2 laser, for subglottic stenosis  Steriod Injection       Anesthesia Type:  General    Note:  Disposition: Outpatient   Postop Pain Control: Uneventful            Sign Out: Well controlled pain   PONV: No   Neuro/Psych: Uneventful            Sign Out: Acceptable/Baseline neuro status   Airway/Respiratory: Uneventful            Sign Out: Acceptable/Baseline resp. status   CV/Hemodynamics: Uneventful            Sign Out: Acceptable CV status; No obvious hypovolemia; No obvious fluid overload   Other NRE: NONE   DID A NON-ROUTINE EVENT OCCUR? No       Last vitals:  Vitals Value Taken Time   /73 08/29/24 0930   Temp 36.4  C (97.5  F) 08/29/24 0925   Pulse 72 08/29/24 0933   Resp 26 08/29/24 0933   SpO2 100 % 08/29/24 0933   Vitals shown include unfiled device data.    Electronically Signed By: Brooke Liao MD  August 29, 2024  3:04 PM

## 2024-08-29 NOTE — ANESTHESIA CARE TRANSFER NOTE
Patient: Deepthi Aj    Procedure: Procedure(s):  Microdirect laryngoscopy with incision, CO2 laser, for subglottic stenosis  Steriod Injection       Diagnosis: Subglottic stenosis [J38.6]  Dysphagia, unspecified type [R13.10]  Diagnosis Additional Information: No value filed.    Anesthesia Type:   General     Note:    Oropharynx: oropharynx clear of all foreign objects and spontaneously breathing  Level of Consciousness: awake  Oxygen Supplementation: face mask  Level of Supplemental Oxygen (L/min / FiO2): 8  Independent Airway: airway patency satisfactory and stable  Dentition: dentition unchanged  Vital Signs Stable: post-procedure vital signs reviewed and stable  Report to RN Given: handoff report given  Patient transferred to: PACU  Comments: Arrived to PACU with patient. Spontaneous RR on supplemental O2. Monitors applied, VSS, PIV/airway patent, Verbal Report to RN in which all questions/concerns answered.  No pain or nausea. Tearful.   Handoff Report: Identifed the Patient, Identified the Reponsible Provider, Reviewed the pertinent medical history, Discussed the surgical course, Reviewed Intra-OP anesthesia mangement and issues during anesthesia, Set expectations for post-procedure period and Allowed opportunity for questions and acknowledgement of understanding before confirming post procedural orders were in.     Handoff Report: Identifed the Patient, Identified the Reponsible Provider, Reviewed the pertinent medical history, Discussed the surgical course, Reviewed Intra-OP anesthesia mangement and issues during anesthesia, Set expectations for post-procedure period and Allowed opportunity for questions and acknowledgement of understanding      Vitals:  Vitals Value Taken Time   /107 08/29/24 0900   Temp 36.3  C (97.4  F) 08/29/24 0900   Pulse 79 08/29/24 0905   Resp 14 08/29/24 0905   SpO2 100 % 08/29/24 0905   Vitals shown include unfiled device data.    Electronically Signed By: Lina  MD Sloane  August 29, 2024  9:05 AM

## 2024-08-29 NOTE — ANESTHESIA PROCEDURE NOTES
Airway       Patient location during procedure: OR       Procedure Start/Stop Times: 8/29/2024 7:47 AM  Staff -        Anesthesiologist:  Brooke Liao MD       Resident/Fellow: Lina Anton MD       Performed By: with residents and anesthesiologist       Procedure performed by resident/fellow/CRNA in presence of a teaching physician.    Consent for Airway        Urgency: elective  Indications and Patient Condition       Indications for airway management: barbara-procedural       Induction type:intravenous       Mask difficulty assessment: 2 - vent by mask + OA or adjuvant +/- NMBA    Final Airway Details       Final airway type: endotracheal airway       Successful airway: ETT - single  Endotracheal Airway Details        ETT size (mm): 5.0       Cuffed: yes       Successful intubation technique: direct laryngoscopy       DL Blade Type: Other (comment) (rigid bronchoscopy)       Grade View of Cords: 1       Adjucts: stylet       Position: Right       Measured from: gums/teeth       Secured at (cm): 24       Bite block used: None    Post intubation assessment        Placement verified by: capnometry, equal breath sounds and chest rise        Number of attempts at approach: 1       Ease of procedure: easy       Dentition: Intact and Unchanged    Medication(s) Administered   Medication Administration Time: 8/29/2024 7:47 AM    Additional Comments       Following a timeout, patient masked down with volatile anesthetic before receiving IV propofol and fentanyl. 2 Hand mask ventilation easy. Dr. Valadez then intubated with a 5.0 ETT using rigid bronchscopy before confirming ETCO2 and bilateral breath sounds.

## 2024-08-29 NOTE — BRIEF OP NOTE
[facility] Brief Operative Note    Pre-operative diagnosis: Subglottic stenosis [J38.6]  Dysphagia, unspecified type [R13.10]  GPA   Post-operative diagnosis As above   Procedure: Microdirect laryngoscopy with incision and CO2 laser, for subglottic stenosis  Steroid Injection   Surgeon(s): Kelsey Valadez MD - Primary   Estimated blood loss: Minimal   Specimens: * No specimens in log *   Findings: Easy mask, easy exposure with Ossoff Pilling laryngoscope. Stenosis beginning 10 mm below true vocal folds, 10 mm long, 7 mm max diameter. No acute granulation, no purulence. Distal trachea clear.

## 2024-08-29 NOTE — DISCHARGE INSTRUCTIONS
Contacting your Doctor -   To contact a doctor, call Dr Valadez at  the ENT- Otolaryngology Clinic at [phone] or:  [phone] and ask for the resident on call for ENT-Otolaryngology (answered 24 hours a day)   Emergency Department:  [facility]: [phone]  [phone] if you are in need of immediate or emergent help   After Anesthesia (Sleep Medicine)  What should I do after anesthesia?  You should rest and relax for the next 24 hours. Avoid risky or difficult (strenuous) activity. A responsible adult should stay with you overnight.  Don't drive or use any heavy equipment for 24 hours. Even if you feel normal, your reactions may be affected by the sleep medicine given to you.  Don't drink alcohol or make any important decisions for 24 hours.  Slowly get back to your regular diet, as you feel able.  How should I expect to feel?  It's normal to feel dizzy, light-headed, or faint for up to a full day after anesthesia or while taking pain medicine. If this happens:   Sit down for a few minutes before standing.  Have someone help you when you get up to walk or use the bathroom.  If you have nausea (feel sick to your stomach) or vomit (throw up):   Drink clear liquids (such as apple juice, ginger ale, broth, or 7UP) until you feel better.  If you feel sick to your stomach, or you keep vomiting for 24 hours, please call the doctor.  What else should I know?  You might have a dry mouth, sore throat, muscle aches, or trouble sleeping. These should go away after 24 hours.  Please contact your doctor if you have any other symptoms that concern you, such as fever, pain, bleeding, fluid drainage, swelling, or headache, or if it's been over 8 to 10 hours and you still aren't able to pee (urinate).  If you have a history of sleep apnea, it's very important to use your CPAP machine for the next 24 hours when you nap or sleep.   For informational purposes only. Not to replace the advice of your health care provider. Copyright    2023 [facility]. All rights reserved. Clinically reviewed by Joe Jett MD. [employer] [id number] - REV 09/23.

## 2024-08-29 NOTE — ANESTHESIA POSTPROCEDURE EVALUATION
Patient: Deepthi Aj    Procedure: Procedure(s):  Microdirect laryngoscopy with incision, CO2 laser, for subglottic stenosis  Steriod Injection       Anesthesia Type:  General    Note:  Disposition: Outpatient   Postop Pain Control: Uneventful            Sign Out: Well controlled pain   PONV: No   Neuro/Psych: Uneventful            Sign Out: Acceptable/Baseline neuro status   Airway/Respiratory: Uneventful            Sign Out: Acceptable/Baseline resp. status   CV/Hemodynamics: Uneventful            Sign Out: Acceptable CV status; No obvious hypovolemia; No obvious fluid overload   Other NRE: NONE   DID A NON-ROUTINE EVENT OCCUR?            Last vitals:  Vitals Value Taken Time   /100 08/29/24 0915   Temp 36.3  C (97.4  F) 08/29/24 0900   Pulse 60 08/29/24 0922   Resp 18 08/29/24 0922   SpO2 100 % 08/29/24 0922   Vitals shown include unfiled device data.    Electronically Signed By: Lin Mccarthy MD  August 29, 2024  9:23 AM

## 2024-08-29 NOTE — OP NOTE
PROCEDURE(S):  Microdirect laryngoscopy  CO2 laser incision of subglottic stenosis under microscopic visualization  Steroid injection to subglottic stenosis under telescopic visualization    PRE-OPERATIVE DIAGNOSIS:   Subglottic Stenosis  Granulomatosis with polyangiitis    POST-OPERATIVE DIAGNOSIS:   As above    SURGEON: Kelsey Valadez MD MPH    ANAESTHESIA: General, with intermittent endolaryngeal/endotracheal intubation     INDICATIONS FOR PROCEDURE:   Deepthi Aj is a 33 year old year old female with a history of dyspnea and GPA who was noted to have recurrent subglottic stenosis.  Operative intervention was recommended. After the risks, benefits, and alternatives had been discussed, the patient wished to proceed and presented for the procedure(s) listed above.    DESCRIPTION OF PROCEDURE:   The patient was brought to the operating room and placed supine. A time-out was called to verify patient identity, operative site, and planned procedure. The operative site was prepped in the usual clean fashion. Moist gauze eye pads were placed and secured. A head wrap was placed, and custom molded thermoplastic tooth guards were placed. General anesthesia was induced with mask ventilation by Anesthesia. Direct laryngoscopy was then performed with an Ossoff-Pilling laryngoscope, which was placed in suspension. Ventilation was established via endotracheal tube placement through the laryngoscope and good chest rise was observed. The vocal folds were examined with 0 degree telescope with findings as below. The laryngoscope was slightly advanced to allow improved access to the area of stenosis. Tracheoscopy revealed no additional lesions or areas of narrowing.    The operating microscope was then brought into position. Laser safety precautions were utilized at all times, including eye protection for the patient and staff, use of wet towels, and removing the endotracheal tube during laser use. A laser safety checklist was  completed by all staff in the room. As needed, moistened cottonoids or laser-safe backstops were used. The Lumenis CO2 Accublade laser was attached to the microscope and used at a depth setting of 2 and 8 Singh. Incisions were made in the most prominent areas of the stenosis, at 2 and 6 o'clock. These were widened into a wedge shape. A small additional incision was made at 10 o'clock. The endotracheal tube was replaced as needed to maintain good respiratory support. The patient was noted to have rapid desaturations as well as rapid recovery, as observed in prior procedures. Cottonoids soaked in 1:10,000 epinephrine were sparingly used to maintain hemostasis.     After the incisions were completed, 1.2 cc of Kenalog-40 was injected into the stenosis under telescopic visualization.    As needed during the procedure and at the conclusion of the procedure, the operating microscope was moved out of position and the 0 degree rigid endoscope was again used to examine the airway and confirm completion of the stated objectives of the procedure. After cottonoid and sponge counts were confirmed correct, the airway was suctioned and 2 cc of 4% lidocaine were applied transglottically for laryngotracheal anesthesia. The laryngoscope and tooth guards were removed. The lips, teeth, and tongue were examined and no injuries were noted. The oropharynx was suctioned clean. Care of the patient was returned to Anesthesia.      FINDINGS:   Easy mask, easy exposure with Ossoff Pilling laryngoscope. Stenosis beginning 10 mm below true vocal folds, 10 mm long, 7 mm max diameter. No acute granulation, no purulence. Distal trachea clear. Rapid desaturations upon apneas, rapid recovery with intubation.      SPECIMEN(S):   None    DRAINS: None    ESTIMATED BLOOD LOSS: Minimal    COMPLICATIONS: None apparent    DISPOSITION: Stable to PACU    ATTENDING PRESENCE STATEMENT:  I was present and participating for the entire procedure from beginning to  completion.

## 2024-09-12 DIAGNOSIS — B49 FUNGAL INFECTION: Primary | ICD-10-CM

## 2024-09-12 RX ORDER — FLUCONAZOLE 100 MG/1
TABLET ORAL
Qty: 8 TABLET | Refills: 0 | Status: SHIPPED | OUTPATIENT
Start: 2024-09-12

## 2024-09-18 ENCOUNTER — TELEPHONE (OUTPATIENT)
Dept: OTOLARYNGOLOGY | Facility: CLINIC | Age: 34
End: 2024-09-18
Payer: COMMERCIAL

## 2024-09-18 NOTE — TELEPHONE ENCOUNTER
Left  message for patient in regards to possibly switching her appt time on 9/20 to either 10 am or 12. Writers contact information provided on  for return call.

## 2024-09-20 ENCOUNTER — MYC MEDICAL ADVICE (OUTPATIENT)
Dept: OTOLARYNGOLOGY | Facility: CLINIC | Age: 34
End: 2024-09-20

## 2024-09-20 ENCOUNTER — OFFICE VISIT (OUTPATIENT)
Dept: OTOLARYNGOLOGY | Facility: CLINIC | Age: 34
End: 2024-09-20
Payer: COMMERCIAL

## 2024-09-20 VITALS — WEIGHT: 293 LBS | HEIGHT: 71 IN | BODY MASS INDEX: 41.02 KG/M2

## 2024-09-20 DIAGNOSIS — J38.6 SUBGLOTTIC STENOSIS: Primary | ICD-10-CM

## 2024-09-20 DIAGNOSIS — M31.30 GRANULOMATOSIS WITH POLYANGIITIS WITHOUT RENAL INVOLVEMENT (H): ICD-10-CM

## 2024-09-20 PROCEDURE — 99213 OFFICE O/P EST LOW 20 MIN: CPT | Mod: 25 | Performed by: OTOLARYNGOLOGY

## 2024-09-20 PROCEDURE — 31575 DIAGNOSTIC LARYNGOSCOPY: CPT | Performed by: OTOLARYNGOLOGY

## 2024-09-20 NOTE — PATIENT INSTRUCTIONS
1.  You were seen in the ENT Clinic today by . If you have any questions or concerns after your appointment, please call [phone]. Press option #1 for scheduling related needs. Press option #3 for Nurse advice.    2.   has recommended the following:   - consider rhinology evaluation for seasonal sequelae of GPA    3.  Plan is to return to clinic as needed      Lala Tang LPN  [phone]  [facility] - Otolaryngology

## 2024-09-20 NOTE — PROGRESS NOTES
Dear Colleague:  Deepthi Aj recently returned for follow-up at the [facility]. My clinic note from our visit is enclosed below.  Speech recognition software may have been used in the documentation below; input is reviewed before signature to the best of my ability.     I appreciate the ongoing opportunity to participate in this patient's care.    Please feel free to contact me with any questions.      Sincerely yours,  Kelsey Valadez M.D., M.P.H.  , Laryngology  Director, 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     Procedure history:  11/23/22, 5/25/23, 8/29/24: Microdirect laryngoscopy with CO2 laser incision, steroid injection, and dilation, for subglottic stenosis  1/19/23, 4/13/23: Flexible fiberoptic laryngoscopy with steroid injection      Today's updates:  - fluconazole helped with thrush  - tongue was numb but has resolved  - breathing is feeling really good  - peak flows 500s  - still having a lot of mucus  - voice, swallow are fine  - no new PMH/PSH, but noting some shoulder pain, may be getting a workup      MEDICATIONS:     Current Outpatient Medications   Medication Sig Dispense Refill    albuterol (PROVENTIL) (2.5 MG/3ML) 0.083% neb solution Inhale 2.5 mg into the lungs      ascorbic acid 62.5 mg TABS quarter-tab 1,000 mg      Avacopan 10 MG CAPS 3 capsules 2 times daily      azaTHIOprine (IMURAN) 50 MG tablet Take 50 mg by mouth daily      azelastine-fluticasone (DYMISTA) 137-50 MCG/ACT nasal spray       beclomethasone HFA (QVAR REDIHALER) 80 MCG/ACT inhaler Inhale 1 puff into the lungs 2 times daily 10.6 g 11    budesonide (PULMICORT) 0.5 MG/2ML neb solution Take 2 mLs (0.5 mg) by nebulization as needed (as needed up to 4 times daily to help  manage subglottic stenosis) 240 mL 4    Calcium Carb-Cholecalciferol (CALCIUM 1000 + D) 1000-20 MG-MCG TABS Take 1 tablet by mouth daily      cholecalciferol 125 MCG (5000 UT) CAPS Take 1 capsule by mouth 2 times daily      cyclobenzaprine (FLEXERIL) 10 MG tablet Take 10 mg by mouth      doxylamine (UNISOM) 25 MG TABS tablet Take 12.5 mg by mouth      fluconazole (DIFLUCAN) 100 MG tablet Take two tablets the first day, then one tablet the rest of the days by mouth. 8 tablet 0    fluticasone (FLOVENT HFA) 220 MCG/ACT inhaler Inhale 2 puffs into the lungs 2 times daily 12 g 3    ibuprofen (ADVIL/MOTRIN) 200 MG tablet Take 200-400 mg by mouth      labetalol (NORMODYNE) 200 MG tablet Take 200 mg by mouth 2 times daily      naproxen (NAPROSYN) 500 MG tablet TAKE ONE TABLET BY MOUTH TWICE DAILY AS NEEDED      phentermine (ADIPEX-P) 37.5 MG capsule Take 37.5 mg by mouth      predniSONE (DELTASONE) 5 MG tablet Take 7.5 mg by mouth daily      Probiotic Product (PROBIOTIC DAILY PO)       riTUXimab (RITUXAN) 10 MG/ML injection       sodium chloride 0.9 % neb solution Take 5 mLs by nebulization as needed for other (as needed up to 4 times per day to manage subglottic stenosis) 600 mL 4    sulfamethoxazole-trimethoprim (BACTRIM) 400-80 MG tablet Take 1 tablet by mouth daily      valACYclovir (VALTREX) 500 MG tablet Take 500 mg by mouth      vitamin C (ASCORBIC ACID) 500 MG tablet Take 500 mg by mouth daily         ALLERGIES:    Allergies   Allergen Reactions    Metformin Diarrhea and Nausea and Vomiting       NEW PMH/PSH: None    REVIEW OF SYSTEMS:  The patient completed a comprehensive 11 point review of systems (below), which was reviewed. Positives are as noted below.  Patient Supplied Answers to Review of Systems Noncontributory         PHYSICAL EXAM:  General: The patient was alert and conversant, and in no acute distress. Stable saddle nose deformity.  Oral cavity/oropharynx: No masses or lesions. Dentition unchanged since  prior. Tongue mobility and palate elevation intact and symmetric.  Resp: Breathing comfortably, no stridor or stertor.  Neuro: Symmetric facial function. Other cranial nerve function as documented above.  Psych: Normal affect, pleasant and cooperative.  Voice/speech: No significant dysphonia.      Procedure:   Flexible Laryngoscopy  Indications: This procedure was warranted to evaluate the patient's airway anatomy. Risks, benefits, and alternatives were discussed.  Description: After informed consent was obtained, a time-out was performed to confirm patient identity, procedure, and procedure site. Topical 3% lidocaine with 0.25% phenylephrine was applied to the nasal cavities and oropharynx. 2 ml of 2% lidocaine was delivered via catheter through the channel of the endoscope and the patient was instructed to cough and expectorate into the suction. I performed the endoscopy and no complications were apparent.  Performed by: Kelsey Valadez MD MPH  Findings: Heavy burden of thick yellow nasal secretions. Glottis patent with good bilateral vocal fold mobility. Subglottis with mild stenosis. Cervical trachea patent, distal trachea not well visualized.           IMPRESSION AND PLAN:   Deepthi Aj returns with a patent airway consistent with her recent airway surgery. Functionally she is doing well. Still having significant secretions, including nasal secretions. She notes that even with management of her GPA, her sinonasal symptoms have not noticeably changed.    Plan:  -discussed consideration of rhinology evaluation for sinonasal sequelae of GPA--I have messaged my rhinology colleagues to ask whether they think this would potentially be useful  -regarding follow-up with laryngology, she would prefer to reach out when she needs help rather than scheduling a specific visit, as she feel comfortable monitoring her symptoms at this point.    I spent a total of 27 minutes on 9/20/2024 in chart review, review of tests,  patient visit, documentation, care coordination, and/or discussion with other providers about the issues documented above, separate from any documented procedure(s).

## 2024-09-20 NOTE — LETTER
9/20/2024      RE: Deepthi Aj  [address] [facility] [address]       Dear Colleague:  Deepthi Aj recently returned for follow-up at the [facility]. My clinic note from our visit is enclosed below.  Speech recognition software may have been used in the documentation below; input is reviewed before signature to the best of my ability.     I appreciate the ongoing opportunity to participate in this patient's care.    Please feel free to contact me with any questions.      Sincerely yours,  Kelsey Valadez M.D., M.P.H.  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     Procedure history:  11/23/22, 5/25/23, 8/29/24: Microdirect laryngoscopy with CO2 laser incision, steroid injection, and dilation, for subglottic stenosis  1/19/23, 4/13/23: Flexible fiberoptic laryngoscopy with steroid injection      Today's updates:  - fluconazole helped with thrush  - tongue was numb but has resolved  - breathing is feeling really good  - peak flows 500s  - still having a lot of mucus  - voice, swallow are fine  - no new PMH/PSH, but noting some shoulder pain, may be getting a workup      MEDICATIONS:     Current Outpatient Medications   Medication Sig Dispense Refill     albuterol (PROVENTIL) (2.5 MG/3ML) 0.083% neb solution Inhale 2.5 mg into the lungs       ascorbic acid 62.5 mg TABS quarter-tab 1,000 mg       Avacopan 10 MG CAPS 3 capsules 2 times daily       azaTHIOprine (IMURAN) 50 MG tablet Take 50 mg by mouth daily       azelastine-fluticasone (DYMISTA) 137-50 MCG/ACT nasal spray        beclomethasone HFA (QVAR REDIHALER) 80 MCG/ACT inhaler Inhale 1 puff into the lungs 2 times daily 10.6 g 11     budesonide (PULMICORT) 0.5 MG/2ML neb  solution Take 2 mLs (0.5 mg) by nebulization as needed (as needed up to 4 times daily to help manage subglottic stenosis) 240 mL 4     Calcium Carb-Cholecalciferol (CALCIUM 1000 + D) 1000-20 MG-MCG TABS Take 1 tablet by mouth daily       cholecalciferol 125 MCG (5000 UT) CAPS Take 1 capsule by mouth 2 times daily       cyclobenzaprine (FLEXERIL) 10 MG tablet Take 10 mg by mouth       doxylamine (UNISOM) 25 MG TABS tablet Take 12.5 mg by mouth       fluconazole (DIFLUCAN) 100 MG tablet Take two tablets the first day, then one tablet the rest of the days by mouth. 8 tablet 0     fluticasone (FLOVENT HFA) 220 MCG/ACT inhaler Inhale 2 puffs into the lungs 2 times daily 12 g 3     ibuprofen (ADVIL/MOTRIN) 200 MG tablet Take 200-400 mg by mouth       labetalol (NORMODYNE) 200 MG tablet Take 200 mg by mouth 2 times daily       naproxen (NAPROSYN) 500 MG tablet TAKE ONE TABLET BY MOUTH TWICE DAILY AS NEEDED       phentermine (ADIPEX-P) 37.5 MG capsule Take 37.5 mg by mouth       predniSONE (DELTASONE) 5 MG tablet Take 7.5 mg by mouth daily       Probiotic Product (PROBIOTIC DAILY PO)        riTUXimab (RITUXAN) 10 MG/ML injection        sodium chloride 0.9 % neb solution Take 5 mLs by nebulization as needed for other (as needed up to 4 times per day to manage subglottic stenosis) 600 mL 4     sulfamethoxazole-trimethoprim (BACTRIM) 400-80 MG tablet Take 1 tablet by mouth daily       valACYclovir (VALTREX) 500 MG tablet Take 500 mg by mouth       vitamin C (ASCORBIC ACID) 500 MG tablet Take 500 mg by mouth daily         ALLERGIES:    Allergies   Allergen Reactions     Metformin Diarrhea and Nausea and Vomiting       NEW PMH/PSH: None    REVIEW OF SYSTEMS:  The patient completed a comprehensive 11 point review of systems (below), which was reviewed. Positives are as noted below.  Patient Supplied Answers to Review of Systems Noncontributory         PHYSICAL EXAM:  General: The patient was alert and conversant, and in no acute  distress. Stable saddle nose deformity.  Oral cavity/oropharynx: No masses or lesions. Dentition unchanged since prior. Tongue mobility and palate elevation intact and symmetric.  Resp: Breathing comfortably, no stridor or stertor.  Neuro: Symmetric facial function. Other cranial nerve function as documented above.  Psych: Normal affect, pleasant and cooperative.  Voice/speech: No significant dysphonia.      Procedure:   Flexible Laryngoscopy  Indications: This procedure was warranted to evaluate the patient's airway anatomy. Risks, benefits, and alternatives were discussed.  Description: After informed consent was obtained, a time-out was performed to confirm patient identity, procedure, and procedure site. Topical 3% lidocaine with 0.25% phenylephrine was applied to the nasal cavities and oropharynx. 2 ml of 2% lidocaine was delivered via catheter through the channel of the endoscope and the patient was instructed to cough and expectorate into the suction. I performed the endoscopy and no complications were apparent.  Performed by: Kelsey Valadez MD MPH  Findings: Heavy burden of thick yellow nasal secretions. Glottis patent with good bilateral vocal fold mobility. Subglottis with mild stenosis. Cervical trachea patent, distal trachea not well visualized.           IMPRESSION AND PLAN:   Deepthi Aj returns with a patent airway consistent with her recent airway surgery. Functionally she is doing well. Still having significant secretions, including nasal secretions. She notes that even with management of her GPA, her sinonasal symptoms have not noticeably changed.    Plan:  -discussed consideration of rhinology evaluation for sinonasal sequelae of GPA--I have messaged my rhinology colleagues to ask whether they think this would potentially be useful  -regarding follow-up with laryngology, she would prefer to reach out when she needs help rather than scheduling a specific visit, as she feel comfortable  monitoring her symptoms at this point.    I spent a total of 27 minutes on 9/20/2024 in chart review, review of tests, patient visit, documentation, care coordination, and/or discussion with other providers about the issues documented above, separate from any documented procedure(s).      Kelsey Valadez MD

## 2024-09-20 NOTE — TELEPHONE ENCOUNTER
Left Voicemail (1st Attempt) for the patient to call back and schedule the following:    Appointment type: Return ENT  Provider: Dr. Vásquez  Return date:  3-4 weeks (10/11 - 10/18)  Specialty phone number: [phone]  Additional appointment(s) needed: No  Additonal Notes: 3-4 week follow up

## 2024-09-23 ENCOUNTER — TELEPHONE (OUTPATIENT)
Dept: OTOLARYNGOLOGY | Facility: CLINIC | Age: 34
End: 2024-09-23
Payer: COMMERCIAL

## 2024-10-25 ENCOUNTER — OFFICE VISIT (OUTPATIENT)
Dept: OTOLARYNGOLOGY | Facility: CLINIC | Age: 34
End: 2024-10-25
Payer: COMMERCIAL

## 2024-10-25 VITALS
BODY MASS INDEX: 41.02 KG/M2 | HEART RATE: 84 BPM | OXYGEN SATURATION: 97 % | WEIGHT: 293 LBS | DIASTOLIC BLOOD PRESSURE: 99 MMHG | HEIGHT: 71 IN | SYSTOLIC BLOOD PRESSURE: 164 MMHG

## 2024-10-25 DIAGNOSIS — M31.30 GRANULOMATOSIS WITH POLYANGIITIS WITHOUT RENAL INVOLVEMENT (H): Primary | ICD-10-CM

## 2024-10-25 DIAGNOSIS — M95.0 SADDLE NOSE DEFORMITY, ACQUIRED: ICD-10-CM

## 2024-10-25 DIAGNOSIS — J34.89 NASAL CRUSTING: ICD-10-CM

## 2024-10-25 PROCEDURE — 31237 NSL/SINS NDSC SURG BX POLYPC: CPT | Performed by: STUDENT IN AN ORGANIZED HEALTH CARE EDUCATION/TRAINING PROGRAM

## 2024-10-25 PROCEDURE — 99203 OFFICE O/P NEW LOW 30 MIN: CPT | Mod: 25 | Performed by: STUDENT IN AN ORGANIZED HEALTH CARE EDUCATION/TRAINING PROGRAM

## 2024-10-25 RX ORDER — MUPIROCIN 20 MG/G
OINTMENT TOPICAL
Qty: 22 G | Refills: 3 | Status: SHIPPED | OUTPATIENT
Start: 2024-10-25 | End: 2024-11-04

## 2024-10-25 RX ORDER — BUDESONIDE 0.5 MG/2ML
INHALANT ORAL
Qty: 120 ML | Refills: 3 | Status: SHIPPED | OUTPATIENT
Start: 2024-10-25

## 2024-10-25 ASSESSMENT — PAIN SCALES - GENERAL: PAINLEVEL_OUTOF10: NO PAIN (0)

## 2024-10-25 NOTE — NURSING NOTE
"Chief Complaint   Patient presents with    Consult   Blood pressure (!) 164/99, pulse 84, height 1.791 m (5' 10.5\"), weight (!) 201.4 kg (443 lb 14.4 oz), SpO2 97%. Sean Fernando, EMT    "

## 2024-10-25 NOTE — LETTER
10/25/2024       RE: Deepthi Aj  [address] [facility] [address]     Dear Colleague,    Thank you for referring your patient, Deepthi Aj, to the [facility] [place] at [facility]. Please see a copy of my visit note below.      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, acquired  Comment:   --GPA well controlled but with some persistent sinonasal symptoms  --minimal erosive changes aside from pre-existing saddle nose (no septal perforation)  --continues to have mucosal crusting and irritation  Plan: IMAGESTREAM RECORDING ORDER, mupirocin         (BACTROBAN) 2 % external ointment, budesonide         (PULMICORT) 0.5 MG/2ML neb solution  --ponaris  --mupirocin  --budesonide  --follow up 4-8 weeks to see if medical trial helps with degree of mucosal symptoms    Chief Complaint: GPA    History of Present Illness:   Deepthi Aj is a 33 year old female who presents for consultation regarding GPA.    Bactrim MWF  Daily pred    Has been on astelin/flonase daily for about a year  GPA stable on controller meds  Sees Dr. Valadez for SGS    Issues are nose bleeds, post nasal drip/mucous  No issues with facial pressure/pain  Saddle nose  Sense of smell declining      Review of systems: A 14-point review of systems has been conducted and was negative for any notable symptoms, except as dictated in the history of present illness.     Medical History:  Past Medical History:   Diagnosis Date     Difficult intubation      Granulomatosis with polyangiitis (H)      Subglottic stenosis      Uncomplicated asthma          Surgical History:   Past Surgical History:   Procedure Laterality Date     INJECT STEROID (LOCATION) N/A 11/23/2022    Procedure: steroid injection;  Surgeon: Kelsey Valadez MD;  Location: [facility] OR     INJECT STEROID (LOCATION) N/A 8/29/2024    Procedure: Steriod Injection;  Surgeon: Kelsey Valadez MD;  Location: [facility] OR     LARYNGOSCOPY, FLEXIBLE WITH INJECTION N/A 4/13/2023    Procedure: Transnasal flexible laryngoscopy with steroid injection for subglottic stenosis;  Surgeon: Kelsey Valadez MD;  Location: [facility] OR     LASER CO2 LARYNGOSCOPY N/A 11/23/2022    Procedure: Microdirect laryngoscopy with CO2 Laser incision, dilation, for subglottic stenosis;  Surgeon: Kelsey Valadez MD;  Location: [facility] OR     LASER CO2 LARYNGOSCOPY N/A 5/25/2023    Procedure: Microdirect laryngoscopy with incision, CRE balloon dilation, CO2 laser for subglottic stenosis, steroid injection;  Surgeon: Kelsey Valadez MD;  Location: [facility] OR     LASER CO2 LARYNGOSCOPY N/A 8/29/2024    Procedure: Microdirect laryngoscopy with incision, CO2 laser, for subglottic stenosis;  Surgeon: Kelesy Valadez MD;  Location: [facility] OR        Family History:  No family history on file.     Social History:   Social History     Socioeconomic History     Marital status:    Tobacco Use     Smoking status: Never     Smokeless tobacco: Never   Substance and Sexual Activity     Alcohol use: Yes     Drug use: Not Currently     Types: Marijuana     Social Drivers of Health     Financial Resource Strain: Low Risk  (7/22/2024)    [facility]    Financial Resource Strain      Difficulty of Paying Living Expenses: 3   Food Insecurity: No Food Insecurity (7/22/2024)    [facility]    Food Insecurity      Do you worry your food will run out before you are able to buy more?: 1   Transportation Needs: No Transportation  "Needs (7/22/2024)    [facility] from [facility]    Transportation Needs      Lack of Transportation (Medical): 1   Social Connections: Socially Integrated (7/22/2024)    Received from [facility]    Social Connections      Frequency of Communication with Friends and Family: 0   Interpersonal Safety: Low Risk  (8/29/2024)    Interpersonal Safety      Do you feel physically and emotionally safe where you currently live?: Yes      Within the past 12 months, have you been hit, slapped, kicked or otherwise physically hurt by someone?: No      Within the past 12 months, have you been humiliated or emotionally abused in other ways by your partner or ex-partner?: No   Housing Stability: Low Risk  (7/22/2024)    Received from [facility]    Housing Stability      What is your housing situation today?: 1        Physical Exam:  Vital signs: BP (!) 164/99 (BP Location: Right arm, Patient Position: Sitting, Cuff Size: Adult Regular)   Pulse 84   Ht 1.791 m (5' 10.5\")   Wt (!) 201.4 kg (443 lb 14.4 oz)   SpO2 97%   BMI 62.79 kg/m     General Appearance: No acute distress, appropriate demeanor, conversant  Eyes: moist conjunctivae; EOMI; pupils symmetric; visual acuity grossly intact; no proptosis  Head: normocephalic; overall symmetric appearance without deformity  Face: overall symmetric without deformity  Ears: Normal appearance of external ear; external meatus normal in appearance; TMs intact without perforation bilaterally;   Nose: No external deformity; septum (midline) ; inferior turbinates (non hypertrophic)   Oral Cavity/oropharynx: Normal appearance of mucosa; tongue midline; no mass or lesions; oropharynx without obvious mucosal abnormality  Neck: no palpable lymphadenopathy; thyroid without palpable nodules  Lungs: symmetric chest rise; no wheezing  CV: Good distal perfusion; normal hear rate  Extremities: No " deformity  Neurologic Exam: Cranial nerves II-XII are grossly intact; no focal deficit    Procedure Note  Procedure performed: Rigid nasal endoscopy  Indication: To evaluate for sinonasal pathology not visualized on routine anterior rhinoscopy  Anesthesia: 4% topical lidocaine with 0.05% oxymetazoline  Description of procedure: A 30 degree, 3 mm rigid endoscope was inserted into bilateral nasal cavities and the nasal valves, nasal cavity, middle meatus, sphenoethmoid recess, and nasopharynx were thoroughly evaluated for evidence of obstruction, edema, purulence, polyps and/or mass/lesion.     Findings:    Moderate anterior yellow and bloody crusting debrided with suction  MM SER NP clear     The patient tolerated the procedure well without complication.     Imaging Review:  5/2023 CT sinus - primary review without erosive sinonasal changes but with nasal cavity debris signals.    Andrés Vásquez MD    [facility]  Rhinology  Endoscopic Skull Base Surgery  [facility]  Department of Otolaryngology - Head & Neck Surgery          Again, thank you for allowing me to participate in the care of your patient.      Sincerely,    Andrés Vásquez MD

## 2024-10-25 NOTE — PATIENT INSTRUCTIONS
You were seen in the ENT Clinic today by Dr. Vásquez. If you have any questions or concerns after your appointment, please contact us (see below)    The following has been recommended for you based upon your appointment today:  Mupirocin and budesonide sent to pharmacy, pick OT [place]      Please return to clinic in 6-8 weeks    How to Contact Us:  Send a [employer] message to your provider. Our team will respond to you via [employer]. Occasionally, we will need to call you to get further information.  For urgent matters (Monday-Friday), call the ENT Clinic: [phone] and speak with a call center team member - they will route your call appropriately.   If you'd like to speak directly with a nurse, please find our contact information below. We do our best to check voicemail frequently throughout the day, and will work to call you back within 1-2 days. For urgent matters, please use the general clinic phone numbers listed above.    Madeleine BAHENA RN, BSN   RN Care Coordinator, ENT Clinic  [facility]  Direct: [phone]  Ivon AHUMADA LPN  Direct: [phone]

## 2024-10-25 NOTE — PROGRESS NOTES
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, acquired  Comment:   --GPA well controlled but with some persistent sinonasal symptoms  --minimal erosive changes aside from pre-existing saddle nose (no septal perforation)  --continues to have mucosal crusting and irritation  Plan: IMAGESTREAM RECORDING ORDER, mupirocin         (BACTROBAN) 2 % external ointment, budesonide         (PULMICORT) 0.5 MG/2ML neb solution  --ponaris  --mupirocin  --budesonide  --follow up 4-8 weeks to see if medical trial helps with degree of mucosal symptoms    Chief Complaint: GPA    History of Present Illness:   Deepthi Aj is a 33 year old female who presents for consultation regarding GPA.    Bactrim MWF  Daily pred    Has been on astelin/flonase daily for about a year  GPA stable on controller meds  Sees Dr. Valadez for SGS    Issues are nose bleeds, post nasal drip/mucous  No issues with facial pressure/pain  Saddle nose  Sense of smell declining      Review of systems: A 14-point review of systems has been conducted and was negative for any notable symptoms, except as dictated in the history of present illness.     Medical History:  Past Medical History:   Diagnosis Date    Difficult intubation     Granulomatosis with polyangiitis (H)     Subglottic stenosis     Uncomplicated asthma         Surgical History:   Past Surgical History:   Procedure Laterality Date    INJECT STEROID (LOCATION) N/A 11/23/2022    Procedure: steroid injection;  Surgeon: Kelsey Valadez MD;  Location: [facility] OR    INJECT STEROID (LOCATION) N/A 8/29/2024    Procedure: Steriod Injection;  Surgeon: Kelsey Valadez MD;  Location: [facility]     LARYNGOSCOPY, FLEXIBLE WITH INJECTION N/A 4/13/2023    Procedure: Transnasal flexible laryngoscopy with steroid injection for subglottic stenosis;  Surgeon: Kelsey Valadez MD;  Location: [facility] OR    LASER CO2 LARYNGOSCOPY N/A 11/23/2022    Procedure: Microdirect laryngoscopy with CO2 Laser incision, dilation, for subglottic stenosis;  Surgeon: Kelsey Valadez MD;  Location: [facility] OR    LASER CO2 LARYNGOSCOPY N/A 5/25/2023    Procedure: Microdirect laryngoscopy with incision, CRE balloon dilation, CO2 laser for subglottic stenosis, steroid injection;  Surgeon: Kelsey Valadez MD;  Location: [facility]    LASER CO2 LARYNGOSCOPY N/A 8/29/2024    Procedure: Microdirect laryngoscopy with incision, CO2 laser, for subglottic stenosis;  Surgeon: Kelsey Valadez MD;  Location: [facility] OR        Family History:  No family history on file.     Social History:   Social History     Socioeconomic History    Marital status:    Tobacco Use    Smoking status: Never    Smokeless tobacco: Never   Substance and Sexual Activity    Alcohol use: Yes    Drug use: Not Currently     Types: Marijuana     Social Drivers of Health     Financial Resource Strain: Low Risk  (7/22/2024)    [email or web address]    Financial Resource Strain     Difficulty of Paying Living Expenses: 3   Food Insecurity: No Food Insecurity (7/22/2024)    [email or web address]    Food Insecurity     Do you worry your food will run out before you are able to buy more?: 1   Transportation Needs: No Transportation Needs (7/22/2024)    [email or web address]    Transportation Needs     Lack of Transportation (Medical): 1   Social Connections: Socially Integrated (7/22/2024)    [email or web address]    Social Connections     Frequency of Communication with Friends and Family: 0   Interpersonal  "Safety: Low Risk  (8/29/2024)    Interpersonal Safety     Do you feel physically and emotionally safe where you currently live?: Yes     Within the past 12 months, have you been hit, slapped, kicked or otherwise physically hurt by someone?: No     Within the past 12 months, have you been humiliated or emotionally abused in other ways by your partner or ex-partner?: No   Housing Stability: Low Risk  (7/22/2024)    Received from [facility]    Housing Stability     What is your housing situation today?: 1        Physical Exam:  Vital signs: BP (!) 164/99 (BP Location: Right arm, Patient Position: Sitting, Cuff Size: Adult Regular)   Pulse 84   Ht 1.791 m (5' 10.5\")   Wt (!) 201.4 kg (443 lb 14.4 oz)   SpO2 97%   BMI 62.79 kg/m     General Appearance: No acute distress, appropriate demeanor, conversant  Eyes: moist conjunctivae; EOMI; pupils symmetric; visual acuity grossly intact; no proptosis  Head: normocephalic; overall symmetric appearance without deformity  Face: overall symmetric without deformity  Ears: Normal appearance of external ear; external meatus normal in appearance; TMs intact without perforation bilaterally;   Nose: No external deformity; septum (midline) ; inferior turbinates (non hypertrophic)   Oral Cavity/oropharynx: Normal appearance of mucosa; tongue midline; no mass or lesions; oropharynx without obvious mucosal abnormality  Neck: no palpable lymphadenopathy; thyroid without palpable nodules  Lungs: symmetric chest rise; no wheezing  CV: Good distal perfusion; normal hear rate  Extremities: No deformity  Neurologic Exam: Cranial nerves II-XII are grossly intact; no focal deficit    Procedure Note  Procedure performed: Rigid nasal endoscopy  Indication: To evaluate for sinonasal pathology not visualized on routine anterior rhinoscopy  Anesthesia: 4% topical lidocaine with 0.05% oxymetazoline  Description of procedure: A 30 degree, 3 mm rigid endoscope was " inserted into bilateral nasal cavities and the nasal valves, nasal cavity, middle meatus, sphenoethmoid recess, and nasopharynx were thoroughly evaluated for evidence of obstruction, edema, purulence, polyps and/or mass/lesion.     Findings:    Moderate anterior yellow and bloody crusting debrided with suction  MM SER NP clear     The patient tolerated the procedure well without complication.     Imaging Review:  5/2023 CT sinus - primary review without erosive sinonasal changes but with nasal cavity debris signals.    Andrés Vásquez MD    [facility]  Rhinology  Endoscopic Skull Base Surgery  [facility]  Department of Otolaryngology - Head & Neck Surgery

## 2024-12-19 PROBLEM — J34.89 NASAL CRUSTING: Status: ACTIVE | Noted: 2024-12-19

## 2025-02-03 ENCOUNTER — TELEPHONE (OUTPATIENT)
Dept: OTOLARYNGOLOGY | Facility: CLINIC | Age: 35
End: 2025-02-03
Payer: COMMERCIAL

## 2025-02-03 NOTE — TELEPHONE ENCOUNTER
Scheduled surgery with Dr. Valadez on 2/12/2025    Spoke with: Patient    Surgery is located at [facility]    Patient will be seen for their H&P by their PCP Katelyn Packer NP within 30 days of surgery - Confirmed PCP on file is up to date     Does patient need a consult before upcoming surgery? No    Anesthesia type: General    Requested Imaging required for surgery: No    Patient needs scheduled for their 3 week post op    Patient will receive their surgery packet via [employer] per their preference    Patient was provided 9a arrival time for their surgery. They are aware that this is subject to change & will be notified of their arrival time 2-3 days before surgery.    Additional comments: Patient was instructed to call back with any further questions or concerns.     Dariana Coyne on 2/3/2025 at 12:53 PM

## 2025-02-03 NOTE — TELEPHONE ENCOUNTER
Left patient a voicemail to schedule surgery for Microdirect laryngoscopy with incision, steroid injection, possible dilation, possible CO2 laser, for subglottic stenosis with Dr. Valadez - Left Surgery Scheduling line for callback [phone]    Dariana Coyne on 2/3/2025 at 12:14 PM

## 2025-02-07 RX ORDER — BUPROPION HYDROCHLORIDE 300 MG/1
300 TABLET ORAL EVERY MORNING
COMMUNITY

## 2025-02-11 ENCOUNTER — ANESTHESIA EVENT (OUTPATIENT)
Dept: SURGERY | Facility: CLINIC | Age: 35
End: 2025-02-11
Payer: COMMERCIAL

## 2025-02-11 NOTE — ANESTHESIA PREPROCEDURE EVALUATION
Anesthesia Pre-Procedure Evaluation    Patient: Deepthi Aj   MRN: [id number] : 1990        Procedure : Procedure(s):  Microdirect laryngoscopy with incision possible dilation possible CO2 laser for subglottic stenosis  Inject steroid          Past Medical History:   Diagnosis Date    Difficult intubation     Granulomatosis with polyangiitis (H)     Subglottic stenosis     Uncomplicated asthma       Past Surgical History:   Procedure Laterality Date    INJECT STEROID (LOCATION) N/A 2022    Procedure: steroid injection;  Surgeon: Kelsey Valadez MD;  Location: [facility]    INJECT STEROID (LOCATION) N/A 2024    Procedure: Steriod Injection;  Surgeon: Kelsey Valadez MD;  Location: [facility]    LARYNGOSCOPY, FLEXIBLE WITH INJECTION N/A 2023    Procedure: Transnasal flexible laryngoscopy with steroid injection for subglottic stenosis;  Surgeon: Kelsey Valadez MD;  Location: [facility] OR    LASER CO2 LARYNGOSCOPY N/A 2022    Procedure: Microdirect laryngoscopy with CO2 Laser incision, dilation, for subglottic stenosis;  Surgeon: Kelsey Valadez MD;  Location: [facility]    LASER CO2 LARYNGOSCOPY N/A 2023    Procedure: Microdirect laryngoscopy with incision, CRE balloon dilation, CO2 laser for subglottic stenosis, steroid injection;  Surgeon: Kelsey Valadez MD;  Location: [facility]    LASER CO2 LARYNGOSCOPY N/A 2024    Procedure: Microdirect laryngoscopy with incision, CO2 laser, for subglottic stenosis;  Surgeon: Kelsey Valadez MD;  Location: [facility]      Allergies   Allergen Reactions    Metformin Diarrhea and Nausea and Vomiting      Social History     Tobacco Use    Smoking status: Never    Smokeless tobacco: Never   Substance Use Topics    Alcohol use: Yes      Wt Readings from Last 1 Encounters:   24 (!) 201.7 kg (444 lb 11.2 oz)        Anesthesia Evaluation   Pt has had prior anesthetic. Type: General.    No history of  "anesthetic complications       ROS/MED HX  ENT/Pulmonary: Comment: Subglottic stenosis 2/2 GPA s/p microlaryngoscopy with dilation x3 (last procedure 8/2024) remains on 5 mg prednisone daily. Now with slowly worsening breathing.    (+)     MCKENZIE risk factors,  hypertension, obese,             Intermittent, asthma                  Neurologic:  - neg neurologic ROS     Cardiovascular:     (+)  hypertension- -   -  - -                                 Previous cardiac testing   Echo: Date: 2/16/2022 Results:  Left ventricular ejection fraction is visually estimated at 60%.   No significant valvular abnormalities were identified.   Pulmonary artery systolic pressure estimate is 27mmHg above RAP.   (Normal) .   Stress Test:  Date: Results:    ECG Reviewed:  Date: Results:    Cath:  Date: Results:   (-) murmur and wheezes   METS/Exercise Tolerance:     Hematologic:  - neg hematologic  ROS  (-) anemia   Musculoskeletal:  - neg musculoskeletal ROS     GI/Hepatic: Comment: Dysphagia with solid foods     (+) GERD, Asymptomatic on medication,                  Renal/Genitourinary:  - neg Renal ROS     Endo:  - neg endo ROS   (+)            Chronic steroid usage for   Obesity (BMI > 60),       Psychiatric/Substance Use:  - neg psychiatric ROS     Infectious Disease:       Malignancy:       Other:            Physical Exam    Airway      Comment: Thick neck    Mallampati: I   TM distance: > 3 FB   Neck ROM: full   Mouth opening: > 3 cm    Respiratory Devices and Support         Dental       (+) Minor Abnormalities - some fillings, tiny chips      Cardiovascular          Rhythm and rate: regular and normal (-) no murmur    Pulmonary           breath sounds clear to auscultation   (-) no rhonchi and no wheezes        OUTSIDE LABS:  CBC: No results found for: \"WBC\", \"HGB\", \"HCT\", \"PLT\"  BMP:   Lab Results   Component Value Date    GLC 94 11/23/2022     COAGS: No results found for: \"PTT\", \"INR\", \"FIBR\"  POC: No results found for: \"BGM\", " "\"HCG\", \"HCGS\"  HEPATIC: No results found for: \"ALBUMIN\", \"PROTTOTAL\", \"ALT\", \"AST\", \"GGT\", \"ALKPHOS\", \"BILITOTAL\", \"BILIDIRECT\", \"KELLY\"  OTHER: No results found for: \"PH\", \"LACT\", \"A1C\", \"EBONIE\", \"PHOS\", \"MAG\", \"LIPASE\", \"AMYLASE\", \"TSH\", \"T4\", \"T3\", \"CRP\", \"SED\"    Anesthesia Plan    ASA Status:  3       Anesthesia Type: General.     - Airway: ETT   Induction: Propofol.   Maintenance: TIVA.        Consents    Anesthesia Plan(s) and associated risks, benefits, and realistic alternatives discussed. Questions answered and patient/representative(s) expressed understanding.     - Discussed:     - Discussed with:  Patient      - Extended Intubation/Ventilatory Support Discussed: No.      - Patient is DNR/DNI Status: No     Use of blood products discussed: No .     Postoperative Care    Pain management: IV analgesics, Oral pain medications.   PONV prophylaxis: Ondansetron (or other 5HT-3), Dexamethasone or Solumedrol     Comments:    Other Comments: D/w patient and Dr. Valadez.  Given risk of lung derecruitment and previous short periods of tolerated apnea, relatively straightforward BMV in the past, will plan to start 90deg, induce via propofol/NMB, then provide intermittent ventilation via 5.0 regular cuffed ETT in the field via micro-DL.  Will also employ THRIVE (HHFNC) with decreasing FiO2 to 30% during periods of laser, but otherwise will run 100% FiO2 via HHFNC.  I discussed the risks and benefits of general anesthesia with the patient.  Questions were sought and answered.      Nabeel Haynes MD  Attending Anesthesiologist               Viral Sadler MD    I have reviewed the pertinent notes and labs in the chart from the past 30 days and (re)examined the patient.  Any updates or changes from those notes are reflected in this note.    Clinically Significant Risk Factors Present on Admission                                          "

## 2025-02-12 ENCOUNTER — HOSPITAL ENCOUNTER (OUTPATIENT)
Facility: CLINIC | Age: 35
Discharge: HOME | End: 2025-02-12
Attending: OTOLARYNGOLOGY | Admitting: OTOLARYNGOLOGY
Payer: COMMERCIAL

## 2025-02-12 ENCOUNTER — ANESTHESIA (OUTPATIENT)
Dept: SURGERY | Facility: CLINIC | Age: 35
End: 2025-02-12
Payer: COMMERCIAL

## 2025-02-12 VITALS
DIASTOLIC BLOOD PRESSURE: 67 MMHG | WEIGHT: 293 LBS | TEMPERATURE: 98.4 F | BODY MASS INDEX: 41.02 KG/M2 | RESPIRATION RATE: 18 BRPM | HEIGHT: 71 IN | SYSTOLIC BLOOD PRESSURE: 123 MMHG | OXYGEN SATURATION: 100 % | HEART RATE: 60 BPM

## 2025-02-12 PROCEDURE — 710N000012 HC RECOVERY PHASE 2, PER MINUTE: Performed by: OTOLARYNGOLOGY

## 2025-02-12 PROCEDURE — 250N000011 HC RX IP 250 OP 636

## 2025-02-12 PROCEDURE — 258N000003 HC RX IP 258 OP 636

## 2025-02-12 PROCEDURE — 272N000001 HC OR GENERAL SUPPLY STERILE: Performed by: OTOLARYNGOLOGY

## 2025-02-12 PROCEDURE — 370N000017 HC ANESTHESIA TECHNICAL FEE, PER MIN: Performed by: OTOLARYNGOLOGY

## 2025-02-12 PROCEDURE — 710N000010 HC RECOVERY PHASE 1, LEVEL 2, PER MIN: Performed by: OTOLARYNGOLOGY

## 2025-02-12 PROCEDURE — 250N000013 HC RX MED GY IP 250 OP 250 PS 637

## 2025-02-12 PROCEDURE — 250N000011 HC RX IP 250 OP 636: Performed by: OTOLARYNGOLOGY

## 2025-02-12 PROCEDURE — 31571 LARYNGOSCOP W/VC INJ + SCOPE: CPT | Mod: 51 | Performed by: OTOLARYNGOLOGY

## 2025-02-12 PROCEDURE — 999N000141 HC STATISTIC PRE-PROCEDURE NURSING ASSESSMENT: Performed by: OTOLARYNGOLOGY

## 2025-02-12 PROCEDURE — 360N000077 HC SURGERY LEVEL 4, PER MIN: Performed by: OTOLARYNGOLOGY

## 2025-02-12 PROCEDURE — 250N000009 HC RX 250

## 2025-02-12 PROCEDURE — 31541 LARYNSCOP W/TUMR EXC + SCOPE: CPT | Mod: GC | Performed by: OTOLARYNGOLOGY

## 2025-02-12 RX ORDER — ONDANSETRON 2 MG/ML
INJECTION INTRAMUSCULAR; INTRAVENOUS PRN
Status: DISCONTINUED | OUTPATIENT
Start: 2025-02-12 | End: 2025-02-12

## 2025-02-12 RX ORDER — ONDANSETRON 2 MG/ML
4 INJECTION INTRAMUSCULAR; INTRAVENOUS EVERY 30 MIN PRN
Status: DISCONTINUED | OUTPATIENT
Start: 2025-02-12 | End: 2025-02-12 | Stop reason: HOSPADM

## 2025-02-12 RX ORDER — SODIUM CHLORIDE, SODIUM LACTATE, POTASSIUM CHLORIDE, CALCIUM CHLORIDE 600; 310; 30; 20 MG/100ML; MG/100ML; MG/100ML; MG/100ML
INJECTION, SOLUTION INTRAVENOUS CONTINUOUS
Status: DISCONTINUED | OUTPATIENT
Start: 2025-02-12 | End: 2025-02-12 | Stop reason: HOSPADM

## 2025-02-12 RX ORDER — SODIUM CHLORIDE, SODIUM LACTATE, POTASSIUM CHLORIDE, CALCIUM CHLORIDE 600; 310; 30; 20 MG/100ML; MG/100ML; MG/100ML; MG/100ML
INJECTION, SOLUTION INTRAVENOUS CONTINUOUS PRN
Status: DISCONTINUED | OUTPATIENT
Start: 2025-02-12 | End: 2025-02-12

## 2025-02-12 RX ORDER — PROPOFOL 10 MG/ML
INJECTION, EMULSION INTRAVENOUS CONTINUOUS PRN
Status: DISCONTINUED | OUTPATIENT
Start: 2025-02-12 | End: 2025-02-12

## 2025-02-12 RX ORDER — ONDANSETRON 4 MG/1
4 TABLET, ORALLY DISINTEGRATING ORAL EVERY 30 MIN PRN
Status: DISCONTINUED | OUTPATIENT
Start: 2025-02-12 | End: 2025-02-12 | Stop reason: HOSPADM

## 2025-02-12 RX ORDER — ACETAMINOPHEN 325 MG/1
975 TABLET ORAL ONCE
Status: COMPLETED | OUTPATIENT
Start: 2025-02-12 | End: 2025-02-12

## 2025-02-12 RX ORDER — TRIAMCINOLONE ACETONIDE 40 MG/ML
INJECTION, SUSPENSION INTRA-ARTICULAR; INTRAMUSCULAR PRN
Status: DISCONTINUED | OUTPATIENT
Start: 2025-02-12 | End: 2025-02-12 | Stop reason: HOSPADM

## 2025-02-12 RX ORDER — AMPICILLIN AND SULBACTAM 2; 1 G/1; G/1
3 INJECTION, POWDER, FOR SOLUTION INTRAMUSCULAR; INTRAVENOUS
Status: COMPLETED | OUTPATIENT
Start: 2025-02-12 | End: 2025-02-12

## 2025-02-12 RX ORDER — AMPICILLIN AND SULBACTAM 1; .5 G/1; G/1
1.5 INJECTION, POWDER, FOR SOLUTION INTRAMUSCULAR; INTRAVENOUS SEE ADMIN INSTRUCTIONS
Status: DISCONTINUED | OUTPATIENT
Start: 2025-02-12 | End: 2025-02-12 | Stop reason: HOSPADM

## 2025-02-12 RX ORDER — EPINEPHRINE 0.1 MG/ML
INJECTION INTRAVENOUS PRN
Status: DISCONTINUED | OUTPATIENT
Start: 2025-02-12 | End: 2025-02-12 | Stop reason: HOSPADM

## 2025-02-12 RX ORDER — FENTANYL CITRATE 50 UG/ML
INJECTION, SOLUTION INTRAMUSCULAR; INTRAVENOUS PRN
Status: DISCONTINUED | OUTPATIENT
Start: 2025-02-12 | End: 2025-02-12

## 2025-02-12 RX ORDER — NALOXONE HYDROCHLORIDE 0.4 MG/ML
0.1 INJECTION, SOLUTION INTRAMUSCULAR; INTRAVENOUS; SUBCUTANEOUS
Status: DISCONTINUED | OUTPATIENT
Start: 2025-02-12 | End: 2025-02-12 | Stop reason: HOSPADM

## 2025-02-12 RX ORDER — HYDROMORPHONE HCL IN WATER/PF 6 MG/30 ML
0.2 PATIENT CONTROLLED ANALGESIA SYRINGE INTRAVENOUS EVERY 5 MIN PRN
Status: DISCONTINUED | OUTPATIENT
Start: 2025-02-12 | End: 2025-02-12 | Stop reason: HOSPADM

## 2025-02-12 RX ORDER — FENTANYL CITRATE 50 UG/ML
25 INJECTION, SOLUTION INTRAMUSCULAR; INTRAVENOUS EVERY 5 MIN PRN
Status: DISCONTINUED | OUTPATIENT
Start: 2025-02-12 | End: 2025-02-12 | Stop reason: HOSPADM

## 2025-02-12 RX ORDER — DEXAMETHASONE SODIUM PHOSPHATE 4 MG/ML
4 INJECTION, SOLUTION INTRA-ARTICULAR; INTRALESIONAL; INTRAMUSCULAR; INTRAVENOUS; SOFT TISSUE
Status: DISCONTINUED | OUTPATIENT
Start: 2025-02-12 | End: 2025-02-12 | Stop reason: HOSPADM

## 2025-02-12 RX ORDER — DEXAMETHASONE SODIUM PHOSPHATE 10 MG/ML
10 INJECTION, SOLUTION INTRAMUSCULAR; INTRAVENOUS ONCE
Status: DISCONTINUED | OUTPATIENT
Start: 2025-02-12 | End: 2025-02-12 | Stop reason: HOSPADM

## 2025-02-12 RX ORDER — HYDROMORPHONE HCL IN WATER/PF 6 MG/30 ML
0.4 PATIENT CONTROLLED ANALGESIA SYRINGE INTRAVENOUS EVERY 5 MIN PRN
Status: DISCONTINUED | OUTPATIENT
Start: 2025-02-12 | End: 2025-02-12 | Stop reason: HOSPADM

## 2025-02-12 RX ORDER — DEXAMETHASONE SODIUM PHOSPHATE 4 MG/ML
INJECTION, SOLUTION INTRA-ARTICULAR; INTRALESIONAL; INTRAMUSCULAR; INTRAVENOUS; SOFT TISSUE PRN
Status: DISCONTINUED | OUTPATIENT
Start: 2025-02-12 | End: 2025-02-12

## 2025-02-12 RX ORDER — FENTANYL CITRATE 50 UG/ML
50 INJECTION, SOLUTION INTRAMUSCULAR; INTRAVENOUS EVERY 5 MIN PRN
Status: DISCONTINUED | OUTPATIENT
Start: 2025-02-12 | End: 2025-02-12 | Stop reason: HOSPADM

## 2025-02-12 RX ORDER — PROPOFOL 10 MG/ML
INJECTION, EMULSION INTRAVENOUS PRN
Status: DISCONTINUED | OUTPATIENT
Start: 2025-02-12 | End: 2025-02-12

## 2025-02-12 RX ORDER — LIDOCAINE 40 MG/G
CREAM TOPICAL
Status: DISCONTINUED | OUTPATIENT
Start: 2025-02-12 | End: 2025-02-12 | Stop reason: HOSPADM

## 2025-02-12 RX ORDER — LIDOCAINE HYDROCHLORIDE 20 MG/ML
INJECTION, SOLUTION INFILTRATION; PERINEURAL PRN
Status: DISCONTINUED | OUTPATIENT
Start: 2025-02-12 | End: 2025-02-12

## 2025-02-12 RX ADMIN — PROPOFOL 50 MG: 10 INJECTION, EMULSION INTRAVENOUS at 08:24

## 2025-02-12 RX ADMIN — Medication 20 MG: at 08:37

## 2025-02-12 RX ADMIN — Medication 20 MG: at 07:59

## 2025-02-12 RX ADMIN — PROPOFOL 200 MG: 10 INJECTION, EMULSION INTRAVENOUS at 07:53

## 2025-02-12 RX ADMIN — Medication 50 MG: at 07:53

## 2025-02-12 RX ADMIN — PROPOFOL 50 MG: 10 INJECTION, EMULSION INTRAVENOUS at 08:14

## 2025-02-12 RX ADMIN — SODIUM CHLORIDE, POTASSIUM CHLORIDE, SODIUM LACTATE AND CALCIUM CHLORIDE: 600; 310; 30; 20 INJECTION, SOLUTION INTRAVENOUS at 07:29

## 2025-02-12 RX ADMIN — PROPOFOL 50 MG: 10 INJECTION, EMULSION INTRAVENOUS at 08:37

## 2025-02-12 RX ADMIN — LIDOCAINE HYDROCHLORIDE 100 MG: 20 INJECTION, SOLUTION INFILTRATION; PERINEURAL at 07:53

## 2025-02-12 RX ADMIN — ACETAMINOPHEN 975 MG: 325 TABLET, FILM COATED ORAL at 06:55

## 2025-02-12 RX ADMIN — PROPOFOL 200 MCG/KG/MIN: 10 INJECTION, EMULSION INTRAVENOUS at 08:02

## 2025-02-12 RX ADMIN — Medication 100 MG: at 09:03

## 2025-02-12 RX ADMIN — ONDANSETRON 4 MG: 2 INJECTION INTRAMUSCULAR; INTRAVENOUS at 09:02

## 2025-02-12 RX ADMIN — Medication 100 MG: at 09:08

## 2025-02-12 RX ADMIN — PROPOFOL 70 MG: 10 INJECTION, EMULSION INTRAVENOUS at 08:51

## 2025-02-12 RX ADMIN — FENTANYL CITRATE 100 MCG: 50 INJECTION INTRAMUSCULAR; INTRAVENOUS at 08:16

## 2025-02-12 RX ADMIN — FENTANYL CITRATE 100 MCG: 50 INJECTION INTRAMUSCULAR; INTRAVENOUS at 07:53

## 2025-02-12 RX ADMIN — Medication 20 MG: at 08:14

## 2025-02-12 RX ADMIN — DEXAMETHASONE SODIUM PHOSPHATE 10 MG: 4 INJECTION, SOLUTION INTRA-ARTICULAR; INTRALESIONAL; INTRAMUSCULAR; INTRAVENOUS; SOFT TISSUE at 08:16

## 2025-02-12 RX ADMIN — MIDAZOLAM 2 MG: 1 INJECTION INTRAMUSCULAR; INTRAVENOUS at 07:26

## 2025-02-12 RX ADMIN — AMPICILLIN SODIUM AND SULBACTAM SODIUM 3 G: 2; 1 INJECTION, POWDER, FOR SOLUTION INTRAMUSCULAR; INTRAVENOUS at 06:55

## 2025-02-12 ASSESSMENT — ACTIVITIES OF DAILY LIVING (ADL)
ADLS_ACUITY_SCORE: 33
ADLS_ACUITY_SCORE: 32
ADLS_ACUITY_SCORE: 33
ADLS_ACUITY_SCORE: 32

## 2025-02-12 NOTE — ANESTHESIA CARE TRANSFER NOTE
Patient: Deepthi Aj    Procedure: Procedure(s):  Microdirect laryngoscopy with incision, CO2 laser for subglottic stenosis  Inject steroid       Diagnosis: Subglottic stenosis [J38.6]  Granulomatosis with polyangiitis without renal involvement (H) [M31.30]  Morbid obesity (H) [E66.01]  Diagnosis Additional Information: No value filed.    Anesthesia Type:   General     Note:    Oropharynx: oropharynx clear of all foreign objects and spontaneously breathing  Level of Consciousness: awake  Oxygen Supplementation: face mask  Level of Supplemental Oxygen (L/min / FiO2): 4  Independent Airway: airway patency satisfactory and stable  Dentition: dentition unchanged  Vital Signs Stable: post-procedure vital signs reviewed and stable  Report to RN Given: handoff report given  Patient transferred to: PACU    Handoff Report: Identifed the Patient, Identified the Reponsible Provider, Reviewed the pertinent medical history, Discussed the surgical course, Reviewed Intra-OP anesthesia mangement and issues during anesthesia, Set expectations for post-procedure period and Allowed opportunity for questions and acknowledgement of understanding      Vitals:  Vitals Value Taken Time   BP     Temp     Pulse 59 02/12/25 0925   Resp 11 02/12/25 0925   SpO2 100 % 02/12/25 0925   Vitals shown include unfiled device data.    Electronically Signed By: Viral Sadler MD  February 12, 2025  9:25 AM

## 2025-02-12 NOTE — OP NOTE
PROCEDURE(S):  Microdirect laryngoscopy  CO2 laser incision of subglottic stenosis under microscopic visualization  Steroid injection to subglottic stenosis under telescopic visualization      PRE-OPERATIVE DIAGNOSIS:   Subglottic Stenosis    POST-OPERATIVE DIAGNOSIS:   As above    SURGEON: Kelsey Valadez MD MPH    ASSISTANT(S): Homa Valdes MD    ANAESTHESIA: General, with intermittent endolaryngeal/endotracheal intubation, THRIVE    INDICATIONS FOR PROCEDURE:   Deepthi Aj is a 34 year old year old female with a history of dyspnea who was noted to have recurrent subglottic stenosis.  Operative intervention was recommended. After the risks, benefits, and alternatives had been discussed, the patient wished to proceed and presented for the procedure(s) listed above.    DESCRIPTION OF PROCEDURE:   The patient was brought to the operating room and placed supine. A time-out was called to verify patient identity, operative site, and planned procedure. The operative site was prepped in the usual clean fashion. The eyes were taped shut. A head wrap was placed covering the eyes, and custom molded thermoplastic tooth guards were placed. General anesthesia was induced with mask ventilation by Anesthesia. Direct laryngoscopy was then performed with an Ossoff-Pilling laryngoscope, which was placed in suspension. Ventilation was established via endotracheal tube placement and good chest rise was observed. The vocal folds were examined with 0 degree telescope with findings as below. The laryngoscope was slightly advanced to allow improved access to the area of stenosis. Tracheoscopy revealed no additional lesions or areas of narrowing.    The operating microscope was then brought into position. Laser safety precautions were utilized at all times, including eye protection for the patient and staff, use of wet towels, and removing the endotracheal tube and/or lowering the THRIVE concentration to FiO2 30% during laser use. A  laser safety checklist was completed by all staff in the room. As needed, moistened cottonoids or laser-safe backstops were used. The Lumenis CO2 Accublade laser was attached to the microscope and used at a depth setting of 1-2 and 8 Singh. Incisions were made in the most prominent areas of the stenosis, at 1 and 5 o'clock. The endotracheal tube was replaced as needed to maintain good respiratory support, and THRIVE was used whenever the tube was not in place. Cottonoids soaked in 1:10,000 epinephrine were sparingly used to maintain hemostasis. The operating microscope was then moved out of position.    After the incisions were completed, cc of Kenalog-40 was injected into the stenosis under telescopic visualization. Dilation was not performed due to the proximity of the stenosis to the true vocal folds.    As needed during the procedure and at the conclusion of the procedure, the operating microscope was moved out of position and the 0 degree rigid endoscope was again used to examine the airway and confirm completion of the stated objectives of the procedure. After cottonoid and sponge counts were confirmed correct, the airway was suctioned and 2 cc of 4% lidocaine were applied transglottically for laryngotracheal anesthesia. The laryngoscope and tooth guards were removed. The lips, teeth, and tongue were examined and no injuries were noted. The oropharynx was suctioned clean. Care of the patient was returned to Anesthesia.      FINDINGS:   Easy mask; good laryngeal exposure with Ossoff Pilling laryngoscope..  Subglottic stenosis, starting 10 mm below the vocal folds, 10 mm long. Maximal diameter 10 mm. No granulation or purulence. THRIVE appeared helpful for maintaining oxygen saturations for longer, in the context of her history of rapid desaturations during apneic periods in her prior surgical procedures.      SPECIMEN(S):   None    DRAINS: None    ESTIMATED BLOOD LOSS: Minimal    COMPLICATIONS: None  apparent    DISPOSITION: Stable to PACU    ATTENDING PRESENCE STATEMENT:  I was present and participating for the entire procedure from beginning to completion.

## 2025-02-12 NOTE — ANESTHESIA POSTPROCEDURE EVALUATION
Patient: Deepthi Aj    Procedure: Procedure(s):  Microdirect laryngoscopy with incision, CO2 laser for subglottic stenosis  Inject steroid       Anesthesia Type:  General    Note:  Disposition: Outpatient   Postop Pain Control: Uneventful            Sign Out: Well controlled pain   PONV: No   Neuro/Psych: Uneventful            Sign Out: Acceptable/Baseline neuro status   Airway/Respiratory: Uneventful            Sign Out: Acceptable/Baseline resp. status   CV/Hemodynamics: Uneventful            Sign Out: Acceptable CV status; No obvious hypovolemia; No obvious fluid overload   Other NRE: NONE   DID A NON-ROUTINE EVENT OCCUR? No           Last vitals:  Vitals Value Taken Time   /57 02/12/25 1000   Temp 36.5  C (97.7  F) 02/12/25 1000   Pulse 72 02/12/25 1019   Resp 16 02/12/25 1019   SpO2 100 % 02/12/25 1019   Vitals shown include unfiled device data.    Electronically Signed By: Trevor Pineda MD  February 12, 2025  10:24 AM

## 2025-02-12 NOTE — ANESTHESIA POSTPROCEDURE EVALUATION
Patient: Deepthi Aj    Procedure: Procedure(s):  Microdirect laryngoscopy with incision, CO2 laser for subglottic stenosis  Inject steroid       Anesthesia Type:  General    Note:  Disposition: Outpatient   Postop Pain Control: Uneventful            Sign Out: Well controlled pain   PONV: No   Neuro/Psych: Uneventful            Sign Out: Acceptable/Baseline neuro status   Airway/Respiratory: Uneventful            Sign Out: Acceptable/Baseline resp. status   CV/Hemodynamics: Uneventful            Sign Out: Acceptable CV status   Other NRE: NONE   DID A NON-ROUTINE EVENT OCCUR? No    Event details/Postop Comments:  NOTE: case done with THRIVE/heated humidified high flow nasal cannula with propofol/alexandria/fentanyl for maintenance and intermittent intubation with 5.0 cuffed ETT during micro DL (ETT placed by Dr. Valadez via micro DL).  Multiple apneic periods of 3-6min were achieved prior to SpO2 decreasing to 90%, with lung recruitment and PEEP +10 via 5.0 ETT between these apneic periods.  Despite deep NMB (PTC 1-5 by [facility]) and propofol at 250-300mcg/kg/min (ideal body weight) VCs were still occasionally moving.  For future airway cases/micro DL, would recommend remifentanil for deep analgesia, even with planned NMB + TIVA.               Last vitals:  Vitals Value Taken Time   /57 02/12/25 1000   Temp 36.5  C (97.7  F) 02/12/25 1000   Pulse 72 02/12/25 1019   Resp 16 02/12/25 1019   SpO2 100 % 02/12/25 1019   Vitals shown include unfiled device data.    Electronically Signed By: Nabeel Haynes MD  February 12, 2025  11:43 AM

## 2025-02-12 NOTE — OR NURSING
Patient assisted to restroom using gait belt; ambulated without difficulty. Voided sufficient amount; feels more comfortable.  Ready for discharge.

## 2025-02-12 NOTE — DISCHARGE INSTRUCTIONS
"To contact a physician: Call Dr Valadez's clinic Monday through Friday from 0800 am to 4:30 pm at [phone].  During evenings and weekends, please call the Hospital Page  at [phone] and ask for the ENT resident \"on call\".   "

## 2025-02-12 NOTE — ANESTHESIA PROCEDURE NOTES
Airway       Patient location during procedure: OR       Procedure Start/Stop Times: 2/12/2025 8:00 AM  Staff -        Resident/Fellow: Viral Sadler MD       Performed By: resident and with residents       Procedure performed by resident/fellow/CRNA in presence of a teaching physician.    Consent for Airway        Urgency: elective  Indications and Patient Condition       Indications for airway management: barbara-procedural       Induction type:intravenous       Mask difficulty assessment: 1 - vent by mask    Final Airway Details       Final airway type: endotracheal airway       Successful airway: ETT - single  Endotracheal Airway Details        ETT size (mm): 5.0       Cuffed: yes       Successful intubation technique: direct laryngoscopy       DL Blade Type: Other (comment) (rigid broncoscope)       Grade View of Cords: 1       Adjucts: stylet       Position: Center       Bite block used: None    Post intubation assessment        Placement verified by: capnometry, equal breath sounds and chest rise        Number of attempts at approach: 1       Number of other approaches attempted: 0       Ease of procedure: easy       Dentition: Unchanged       Dental guard used and removed. Dental Guard Type: Standard White.    Medication(s) Administered   Medication Administration Time: 2/12/2025 8:00 AM

## 2025-02-12 NOTE — BRIEF OP NOTE
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 Stenosis beginning 10 mm below true vocal folds, 10 mm long, 10 mm max diameter. Use of THRIVE for improved apneic oxygenation. .  Complications: None.  Implants: * No implants in log *

## 2025-02-12 NOTE — OR NURSING
Patient arrived from PACU awake and alert.  Denies any pain or nausea; transfers from stretcher to chair without difficulty.

## 2025-03-03 ENCOUNTER — OFFICE VISIT (OUTPATIENT)
Dept: OTOLARYNGOLOGY | Facility: CLINIC | Age: 35
End: 2025-03-03
Payer: COMMERCIAL

## 2025-03-03 VITALS — HEART RATE: 68 BPM | DIASTOLIC BLOOD PRESSURE: 85 MMHG | SYSTOLIC BLOOD PRESSURE: 136 MMHG

## 2025-03-03 DIAGNOSIS — J34.89 NASAL CRUSTING: ICD-10-CM

## 2025-03-03 DIAGNOSIS — E66.01 MORBID OBESITY (H): ICD-10-CM

## 2025-03-03 DIAGNOSIS — M31.30 GRANULOMATOSIS WITH POLYANGIITIS WITHOUT RENAL INVOLVEMENT (H): ICD-10-CM

## 2025-03-03 DIAGNOSIS — J38.6 SUBGLOTTIC STENOSIS: Primary | ICD-10-CM

## 2025-03-03 ASSESSMENT — PAIN SCALES - GENERAL: PAINLEVEL_OUTOF10: NO PAIN (0)

## 2025-03-03 NOTE — LETTER
3/3/2025      RE: Deepthi Aj  [address] [facility] [address]       Dear Colleague:  Deepthi Aj recently returned for follow-up at the [facility]. My clinic note from our visit is enclosed below.  Speech recognition software may have been used in the documentation below; input is reviewed before signature to the best of my ability.     I appreciate the ongoing opportunity to participate in this patient's care.    Please feel free to contact me with any questions.      Sincerely yours,  Kelsey Valadez M.D., M.P.H.  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     Procedure history:  11/23/22, 5/25/23, 8/29/24, 2/12/25: Microdirect laryngoscopy with CO2 laser incision, steroid injection, and dilation, for subglottic stenosis  1/19/23, 4/13/23: Flexible fiberoptic laryngoscopy with steroid injection    Today's updates:  - Was able to travel and walk a lot; breathing much better  - voice was raspy for about a week post-op, still having some raspiness which she attributes to higher dose of Wellbutrin  - swallowing is fine  - upcoming repeat endoscopy  - is anemia  - no new PMH/PSH  - rheumatologist is considering going from 3x to 4x/year        MEDICATIONS:     Current Outpatient Medications   Medication Sig Dispense Refill     albuterol (PROVENTIL) (2.5 MG/3ML) 0.083% neb solution Inhale 2.5 mg into the lungs       ascorbic acid 62.5 mg TABS quarter-tab 1,000 mg       Avacopan 10 MG CAPS 3 capsules 2 times daily       azaTHIOprine (IMURAN) 50 MG tablet Take 50 mg by mouth daily       azelastine-fluticasone (DYMISTA) 137-50 MCG/ACT nasal spray        beclomethasone HFA (QVAR REDIHALER) 80 MCG/ACT inhaler Inhale 1 puff into the  lungs 2 times daily 10.6 g 11     budesonide (PULMICORT) 0.5 MG/2ML neb solution Empty contents of ampule (2ml) into 240ml of saline solution and rinse both nasal cavities as instructed twice a day. 120 mL 3     budesonide (PULMICORT) 0.5 MG/2ML neb solution Take 2 mLs (0.5 mg) by nebulization as needed (as needed up to 4 times daily to help manage subglottic stenosis) 240 mL 4     buPROPion (WELLBUTRIN XL) 300 MG 24 hr tablet Take 300 mg by mouth every morning.       Calcium Carb-Cholecalciferol (CALCIUM 1000 + D) 1000-20 MG-MCG TABS Take 1 tablet by mouth daily       cholecalciferol 125 MCG (5000 UT) CAPS Take 1 capsule by mouth 2 times daily       cyclobenzaprine (FLEXERIL) 10 MG tablet Take 10 mg by mouth       doxylamine (UNISOM) 25 MG TABS tablet Take 12.5 mg by mouth       fluconazole (DIFLUCAN) 100 MG tablet Take two tablets the first day, then one tablet the rest of the days by mouth. 8 tablet 0     fluticasone (FLOVENT HFA) 220 MCG/ACT inhaler Inhale 2 puffs into the lungs 2 times daily 12 g 3     ibuprofen (ADVIL/MOTRIN) 200 MG tablet Take 200-400 mg by mouth       labetalol (NORMODYNE) 200 MG tablet Take 200 mg by mouth 2 times daily       mupirocin (BACTROBAN) 2 % external ointment Spray 0.5 g in nostril.       naproxen (NAPROSYN) 500 MG tablet TAKE ONE TABLET BY MOUTH TWICE DAILY AS NEEDED       phentermine (ADIPEX-P) 37.5 MG capsule Take 37.5 mg by mouth       predniSONE (DELTASONE) 5 MG tablet Take 5 mg by mouth daily.       Probiotic Product (PROBIOTIC DAILY PO)        riTUXimab (RITUXAN) 10 MG/ML injection        sodium chloride 0.9 % neb solution Take 5 mLs by nebulization as needed for other (as needed up to 4 times per day to manage subglottic stenosis) 600 mL 4     sulfamethoxazole-trimethoprim (BACTRIM) 400-80 MG tablet Take 1 tablet by mouth daily       valACYclovir (VALTREX) 500 MG tablet Take 500 mg by mouth       vitamin C (ASCORBIC ACID) 500 MG tablet Take 500 mg by mouth daily          ALLERGIES:    Allergies   Allergen Reactions     Metformin Diarrhea and Nausea and Vomiting       NEW PMH/PSH: None    REVIEW OF SYSTEMS:  The patient completed a comprehensive 11 point review of systems (below), which was reviewed. Positives are as noted below.  Patient Supplied Answers to Review of Systems Noncontributory       PHYSICAL EXAM:  General: The patient was alert and conversant, and in no acute distress.    Oral cavity/oropharynx: No masses or lesions. Dentition unchanged since prior. Tongue mobility and palate elevation intact and symmetric.  Neck: No palpable cervical lymphadenopathy, no significant tenderness to palpation of the thyrohyoid space, which was narrow. No obvious thyroid abnormality.  Resp: Breathing comfortably, no stridor or stertor.  Neuro: Symmetric facial function. Other cranial nerve function as documented above.  Psych: Normal affect, pleasant and cooperative.  Voice/speech: No significant dysphonia.      Procedure:   Flexible Bronchoscopy (13824)  Indications: This procedure was warranted to evaluate the patient's airway anatomy. Risks, benefits, and alternatives were discussed.  Description: After informed consent was obtained, a time-out was performed to confirm patient identity, procedure, and procedure site. Topical 3% lidocaine with 0.25% phenylephrine was applied to the nasal cavities and oropharynx. 3.5 ml of 2% lidocaine was delivered via catheter through the channel of the endoscope and the patient was instructed to cough and expectorate into the suction. I performed the endoscopy and no complications were apparent.  Performed by: Kelsey Valadez MD MPH  Findings: Glottis patent with good bilateral vocal fold mobility. Subglottis with mild stenosis, no acute granulation/purulence. Trachea patent distally. Kim sharp. Unremarkable takeoffs of mainstem bronchi.                          IMPRESSION AND PLAN:   Deepthi Aj returns with appropriate post-operative  findings; her breathing is feeling great at present.    Plan:  - I concur with consideration of more frequent rituximab due to needing OR at 6 months; discussed that it is hard to predict exactly how long the intraoperative intervals will be, but is worth trying given that the patient states she tolerates it well  - RTC 3-4 months, or sooner as needed. The patient is aware of when and how to contact the clinic.    I spent a total of 25 minutes on 3/3/2025 in chart review, review of tests, patient visit, documentation, care coordination, and/or discussion with other providers about the issues documented above, separate from any documented procedure(s).      Kelsey Valadez MD

## 2025-03-03 NOTE — PATIENT INSTRUCTIONS
1.  You were seen in the ENT Clinic today by Dr. Valadez. If you have any questions or concerns after your appointment, please call [phone]. Press option #1 for scheduling related needs. Press option #3 for Nurse advice.    2.  Plan is to return to clinic 3-4 months       How to Contact Us:  Send a EXENDIS message to your provider. Our team will respond to you via EXENDIS. Occasionally, we will need to call you to get further information.  For urgent matters (Monday-Friday, 8:00 AM-3:30 PM), call the ENT Clinic: [phone] and speak with a call center team member - they will route your call appropriately.   If you'd like to speak directly with a nurse, please call [phone]. We do our best to check voicemail frequently throughout the day, and will work to call you back within 1-2 days. For urgent matters, please use the general clinic phone numbers listed above.      Dana Barron RN  [phone]  [facility] - Otolaryngology

## 2025-03-03 NOTE — PROGRESS NOTES
Dear Colleague:  Deepthi Aj recently returned for follow-up at the [facility]. My clinic note from our visit is enclosed below.  Speech recognition software may have been used in the documentation below; input is reviewed before signature to the best of my ability.     I appreciate the ongoing opportunity to participate in this patient's care.    Please feel free to contact me with any questions.      Sincerely yours,  Kelsey Valadez M.D., M.P.H.  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     Procedure history:  11/23/22, 5/25/23, 8/29/24, 2/12/25: Microdirect laryngoscopy with CO2 laser incision, steroid injection, and dilation, for subglottic stenosis  1/19/23, 4/13/23: Flexible fiberoptic laryngoscopy with steroid injection    Today's updates:  - Was able to travel and walk a lot; breathing much better  - voice was raspy for about a week post-op, still having some raspiness which she attributes to higher dose of Wellbutrin  - swallowing is fine  - upcoming repeat endoscopy  - is anemia  - no new PMH/PSH  - rheumatologist is considering going from 3x to 4x/year        MEDICATIONS:     Current Outpatient Medications   Medication Sig Dispense Refill    albuterol (PROVENTIL) (2.5 MG/3ML) 0.083% neb solution Inhale 2.5 mg into the lungs      ascorbic acid 62.5 mg TABS quarter-tab 1,000 mg      Avacopan 10 MG CAPS 3 capsules 2 times daily      azaTHIOprine (IMURAN) 50 MG tablet Take 50 mg by mouth daily      azelastine-fluticasone (DYMISTA) 137-50 MCG/ACT nasal spray       beclomethasone HFA (QVAR REDIHALER) 80 MCG/ACT inhaler Inhale 1 puff into the lungs 2 times daily 10.6 g 11    budesonide (PULMICORT) 0.5 MG/2ML neb solution Empty contents  of ampule (2ml) into 240ml of saline solution and rinse both nasal cavities as instructed twice a day. 120 mL 3    budesonide (PULMICORT) 0.5 MG/2ML neb solution Take 2 mLs (0.5 mg) by nebulization as needed (as needed up to 4 times daily to help manage subglottic stenosis) 240 mL 4    buPROPion (WELLBUTRIN XL) 300 MG 24 hr tablet Take 300 mg by mouth every morning.      Calcium Carb-Cholecalciferol (CALCIUM 1000 + D) 1000-20 MG-MCG TABS Take 1 tablet by mouth daily      cholecalciferol 125 MCG (5000 UT) CAPS Take 1 capsule by mouth 2 times daily      cyclobenzaprine (FLEXERIL) 10 MG tablet Take 10 mg by mouth      doxylamine (UNISOM) 25 MG TABS tablet Take 12.5 mg by mouth      fluconazole (DIFLUCAN) 100 MG tablet Take two tablets the first day, then one tablet the rest of the days by mouth. 8 tablet 0    fluticasone (FLOVENT HFA) 220 MCG/ACT inhaler Inhale 2 puffs into the lungs 2 times daily 12 g 3    ibuprofen (ADVIL/MOTRIN) 200 MG tablet Take 200-400 mg by mouth      labetalol (NORMODYNE) 200 MG tablet Take 200 mg by mouth 2 times daily      mupirocin (BACTROBAN) 2 % external ointment Spray 0.5 g in nostril.      naproxen (NAPROSYN) 500 MG tablet TAKE ONE TABLET BY MOUTH TWICE DAILY AS NEEDED      phentermine (ADIPEX-P) 37.5 MG capsule Take 37.5 mg by mouth      predniSONE (DELTASONE) 5 MG tablet Take 5 mg by mouth daily.      Probiotic Product (PROBIOTIC DAILY PO)       riTUXimab (RITUXAN) 10 MG/ML injection       sodium chloride 0.9 % neb solution Take 5 mLs by nebulization as needed for other (as needed up to 4 times per day to manage subglottic stenosis) 600 mL 4    sulfamethoxazole-trimethoprim (BACTRIM) 400-80 MG tablet Take 1 tablet by mouth daily      valACYclovir (VALTREX) 500 MG tablet Take 500 mg by mouth      vitamin C (ASCORBIC ACID) 500 MG tablet Take 500 mg by mouth daily         ALLERGIES:    Allergies   Allergen Reactions    Metformin Diarrhea and Nausea and Vomiting       NEW PMH/PSH:  None    REVIEW OF SYSTEMS:  The patient completed a comprehensive 11 point review of systems (below), which was reviewed. Positives are as noted below.  Patient Supplied Answers to Review of Systems Noncontributory       PHYSICAL EXAM:  General: The patient was alert and conversant, and in no acute distress.    Oral cavity/oropharynx: No masses or lesions. Dentition unchanged since prior. Tongue mobility and palate elevation intact and symmetric.  Neck: No palpable cervical lymphadenopathy, no significant tenderness to palpation of the thyrohyoid space, which was narrow. No obvious thyroid abnormality.  Resp: Breathing comfortably, no stridor or stertor.  Neuro: Symmetric facial function. Other cranial nerve function as documented above.  Psych: Normal affect, pleasant and cooperative.  Voice/speech: No significant dysphonia.      Procedure:   Flexible Bronchoscopy (20101)  Indications: This procedure was warranted to evaluate the patient's airway anatomy. Risks, benefits, and alternatives were discussed.  Description: After informed consent was obtained, a time-out was performed to confirm patient identity, procedure, and procedure site. Topical 3% lidocaine with 0.25% phenylephrine was applied to the nasal cavities and oropharynx. 3.5 ml of 2% lidocaine was delivered via catheter through the channel of the endoscope and the patient was instructed to cough and expectorate into the suction. I performed the endoscopy and no complications were apparent.  Performed by: Kelsey Valadez MD MPH  Findings: Glottis patent with good bilateral vocal fold mobility. Subglottis with mild stenosis, no acute granulation/purulence. Trachea patent distally. Kim sharp. Unremarkable takeoffs of mainstem bronchi.                          IMPRESSION AND PLAN:   Deepthi Aj returns with appropriate post-operative findings; her breathing is feeling great at present.    Plan:  - I concur with consideration of more frequent  rituximab due to needing OR at 6 months; discussed that it is hard to predict exactly how long the intraoperative intervals will be, but is worth trying given that the patient states she tolerates it well  - RTC 3-4 months, or sooner as needed. The patient is aware of when and how to contact the clinic.    I spent a total of 25 minutes on 3/3/2025 in chart review, review of tests, patient visit, documentation, care coordination, and/or discussion with other providers about the issues documented above, separate from any documented procedure(s).

## 2025-03-16 ENCOUNTER — HEALTH MAINTENANCE LETTER (OUTPATIENT)
Age: 35
End: 2025-03-16

## (undated) DEVICE — GLOVE BIOGEL PI ULTRATOUCH G SZ 6.5 42165

## (undated) DEVICE — LINEN TOWEL PACK X5 5464

## (undated) DEVICE — TUBING SMOKE EVAC .635CMX3M SEA3705

## (undated) DEVICE — JELLY LUBRICATING SURGILUBE 2OZ TUBE

## (undated) DEVICE — DILATOR CRE PULM BALLOON 12-13.5-15MMX75CM 3CM WIRE 5035

## (undated) DEVICE — ESU CORD BIPOLAR GREEN 10-4000

## (undated) DEVICE — SPONGE COTTONOID 1/4X1/4" 80-1399

## (undated) DEVICE — SOL NACL 0.9% IRRIG 1000ML BOTTLE 2F7124

## (undated) DEVICE — SPONGE COTTONOID 1/2X1 1/2" 1-STRING 80-1404

## (undated) DEVICE — SUCTION MANIFOLD NEPTUNE 2 SYS 4 PORT 0702-020-000

## (undated) DEVICE — ENDO TOOTH QUICK GUARD CONFORMING PROTECTION

## (undated) DEVICE — STRAP UNIVERSAL POSITIONING 2-PIECE 4X47X76" 91-287

## (undated) DEVICE — PEN MARKING SKIN W/LABELS 31145884

## (undated) DEVICE — DRSG EYE PAD STERILE 1.63X2.63" NON21600

## (undated) DEVICE — SUCTION TIP YANKAUER W/O VENT K86

## (undated) DEVICE — BLADE KNIFE SURG 11 371111

## (undated) DEVICE — PAD CHUX UNDERPAD 30X30"

## (undated) DEVICE — NDL BLUNT 18GA 1" W/O FILTER 305181

## (undated) DEVICE — SOL WATER IRRIG 1000ML BOTTLE 2F7114

## (undated) DEVICE — SYR INFLATOR AND GAUGE 60ML M00550601

## (undated) DEVICE — GOWN SM/MED DISP 9505

## (undated) DEVICE — PACK ENT ENDOSCOPY UMMC

## (undated) DEVICE — TUBING SUCTION 10'X3/16" N510

## (undated) DEVICE — ESU GROUND PAD ADULT W/CORD E7507

## (undated) DEVICE — SYR 03ML LL W/O NDL 309657

## (undated) DEVICE — BASIN SET SINGLE STERILE 13752-624

## (undated) DEVICE — NDL BUTTERFLY 25GA .75" 367298

## (undated) DEVICE — SOL WATER IRRIG 500ML BOTTLE 2F7113

## (undated) DEVICE — DRSG GAUZE 4X4" TRAY 6939

## (undated) DEVICE — CUP AND LID 2PK 2OZ STERILE  SSK9006A

## (undated) DEVICE — SUCTION MANIFOLD NEPTUNE 2 SYS 1 PORT 702-025-000

## (undated) DEVICE — ESU PENCIL SMOKE EVAC W/ROCKER SWITCH 0703-047-000

## (undated) DEVICE — ESU ELEC BLADE 2.75" COATED/INSULATED E1455

## (undated) DEVICE — SPONGE COTTONOID 1/2X1/2" 80-1400

## (undated) DEVICE — GLOVE PROTEXIS POWDER FREE SMT 6.5  2D72PT65X

## (undated) DEVICE — SYR 10ML LL W/O NDL 302995

## (undated) DEVICE — SYR 10ML LL W/O NDL

## (undated) DEVICE — SYR PISTON URETHRAL 60ML 68000

## (undated) DEVICE — SPONGE COTTONOID 1/2X1" 20-05S

## (undated) DEVICE — DRSG TELFA 3X8" 1238

## (undated) DEVICE — BLADE KNIFE BEAVER SICKLE EDGE 5.0MM 377300

## (undated) DEVICE — SPONGE COTTONOID NEURO 1/2"X1/2" 30-054

## (undated) DEVICE — SPONGE COTTONOID 2X1/2" 80-1406

## (undated) DEVICE — TRAY EPIDURAL NDL TUOHY PERIFIX 18GA 3.5" W/CATH 332200

## (undated) RX ORDER — FENTANYL CITRATE 50 UG/ML
INJECTION, SOLUTION INTRAMUSCULAR; INTRAVENOUS
Status: DISPENSED
Start: 2023-05-25

## (undated) RX ORDER — TRIAMCINOLONE ACETONIDE 40 MG/ML
INJECTION, SUSPENSION INTRA-ARTICULAR; INTRAMUSCULAR
Status: DISPENSED
Start: 2023-05-25

## (undated) RX ORDER — LIDOCAINE HYDROCHLORIDE 20 MG/ML
INJECTION, SOLUTION INFILTRATION; PERINEURAL
Status: DISPENSED
Start: 2023-05-08

## (undated) RX ORDER — LIDOCAINE HYDROCHLORIDE 20 MG/ML
INJECTION, SOLUTION INFILTRATION; PERINEURAL
Status: DISPENSED
Start: 2023-01-19

## (undated) RX ORDER — LIDOCAINE HYDROCHLORIDE 20 MG/ML
INJECTION, SOLUTION INFILTRATION; PERINEURAL
Status: DISPENSED
Start: 2024-04-05

## (undated) RX ORDER — LIDOCAINE HYDROCHLORIDE AND EPINEPHRINE 10; 10 MG/ML; UG/ML
INJECTION, SOLUTION INFILTRATION; PERINEURAL
Status: DISPENSED
Start: 2023-05-25

## (undated) RX ORDER — FENTANYL CITRATE 50 UG/ML
INJECTION, SOLUTION INTRAMUSCULAR; INTRAVENOUS
Status: DISPENSED
Start: 2022-11-23

## (undated) RX ORDER — AMPICILLIN AND SULBACTAM 2; 1 G/1; G/1
INJECTION, POWDER, FOR SOLUTION INTRAMUSCULAR; INTRAVENOUS
Status: DISPENSED
Start: 2025-02-12

## (undated) RX ORDER — TRIAMCINOLONE ACETONIDE 40 MG/ML
INJECTION, SUSPENSION INTRA-ARTICULAR; INTRAMUSCULAR
Status: DISPENSED
Start: 2024-09-20

## (undated) RX ORDER — EPINEPHRINE IN SOD CHLOR,ISO 1 MG/10 ML
SYRINGE (ML) INTRAVENOUS
Status: DISPENSED
Start: 2022-11-23

## (undated) RX ORDER — TRIAMCINOLONE ACETONIDE 40 MG/ML
INJECTION, SUSPENSION INTRA-ARTICULAR; INTRAMUSCULAR
Status: DISPENSED
Start: 2022-11-23

## (undated) RX ORDER — ACETAMINOPHEN 325 MG/1
TABLET ORAL
Status: DISPENSED
Start: 2025-02-12

## (undated) RX ORDER — FENTANYL CITRATE 50 UG/ML
INJECTION, SOLUTION INTRAMUSCULAR; INTRAVENOUS
Status: DISPENSED
Start: 2025-02-12

## (undated) RX ORDER — EPINEPHRINE 0.1 MG/ML
INJECTION INTRAVENOUS
Status: DISPENSED
Start: 2025-02-12

## (undated) RX ORDER — TRIAMCINOLONE ACETONIDE 40 MG/ML
INJECTION, SUSPENSION INTRA-ARTICULAR; INTRAMUSCULAR
Status: DISPENSED
Start: 2023-04-13

## (undated) RX ORDER — AMPICILLIN AND SULBACTAM 2; 1 G/1; G/1
INJECTION, POWDER, FOR SOLUTION INTRAMUSCULAR; INTRAVENOUS
Status: DISPENSED
Start: 2022-11-23

## (undated) RX ORDER — PROPOFOL 10 MG/ML
INJECTION, EMULSION INTRAVENOUS
Status: DISPENSED
Start: 2024-08-29

## (undated) RX ORDER — TRIAMCINOLONE ACETONIDE 40 MG/ML
INJECTION, SUSPENSION INTRA-ARTICULAR; INTRAMUSCULAR
Status: DISPENSED
Start: 2025-02-12

## (undated) RX ORDER — LIDOCAINE HYDROCHLORIDE 20 MG/ML
INJECTION, SOLUTION INFILTRATION; PERINEURAL
Status: DISPENSED
Start: 2024-09-20

## (undated) RX ORDER — ACETAMINOPHEN 325 MG/1
TABLET ORAL
Status: DISPENSED
Start: 2022-11-23

## (undated) RX ORDER — OXYMETAZOLINE HYDROCHLORIDE 0.05 G/100ML
SPRAY NASAL
Status: DISPENSED
Start: 2024-08-29

## (undated) RX ORDER — AMPICILLIN AND SULBACTAM 2; 1 G/1; G/1
INJECTION, POWDER, FOR SOLUTION INTRAMUSCULAR; INTRAVENOUS
Status: DISPENSED
Start: 2023-05-25

## (undated) RX ORDER — PROPOFOL 10 MG/ML
INJECTION, EMULSION INTRAVENOUS
Status: DISPENSED
Start: 2025-02-12

## (undated) RX ORDER — LIDOCAINE HYDROCHLORIDE 20 MG/ML
INJECTION, SOLUTION INFILTRATION; PERINEURAL
Status: DISPENSED
Start: 2024-08-02

## (undated) RX ORDER — AMPICILLIN AND SULBACTAM 2; 1 G/1; G/1
INJECTION, POWDER, FOR SOLUTION INTRAMUSCULAR; INTRAVENOUS
Status: DISPENSED
Start: 2024-08-29

## (undated) RX ORDER — ACETAMINOPHEN 325 MG/1
TABLET ORAL
Status: DISPENSED
Start: 2024-08-29

## (undated) RX ORDER — TRIAMCINOLONE ACETONIDE 40 MG/ML
INJECTION, SUSPENSION INTRA-ARTICULAR; INTRAMUSCULAR
Status: DISPENSED
Start: 2024-08-29

## (undated) RX ORDER — TRIAMCINOLONE ACETONIDE 40 MG/ML
INJECTION, SUSPENSION INTRA-ARTICULAR; INTRAMUSCULAR
Status: DISPENSED
Start: 2023-01-19

## (undated) RX ORDER — EPINEPHRINE 0.1 MG/ML
INJECTION INTRAVENOUS
Status: DISPENSED
Start: 2024-08-29

## (undated) RX ORDER — LIDOCAINE HYDROCHLORIDE 20 MG/ML
INJECTION, SOLUTION INFILTRATION; PERINEURAL
Status: DISPENSED
Start: 2025-03-03

## (undated) RX ORDER — LIDOCAINE HYDROCHLORIDE 20 MG/ML
INJECTION, SOLUTION INFILTRATION; PERINEURAL
Status: DISPENSED
Start: 2023-06-19

## (undated) RX ORDER — FENTANYL CITRATE 50 UG/ML
INJECTION, SOLUTION INTRAMUSCULAR; INTRAVENOUS
Status: DISPENSED
Start: 2024-08-29